# Patient Record
Sex: FEMALE | Race: WHITE | NOT HISPANIC OR LATINO | Employment: UNEMPLOYED | URBAN - METROPOLITAN AREA
[De-identification: names, ages, dates, MRNs, and addresses within clinical notes are randomized per-mention and may not be internally consistent; named-entity substitution may affect disease eponyms.]

---

## 2017-03-07 ENCOUNTER — APPOINTMENT (OUTPATIENT)
Dept: ADMISSIONS | Facility: MEDICAL CENTER | Age: 42
End: 2017-03-07
Attending: SURGERY
Payer: COMMERCIAL

## 2017-03-07 RX ORDER — HYDROCODONE BITARTRATE AND ACETAMINOPHEN 5; 325 MG/1; MG/1
1-2 TABLET ORAL PRN
Status: ON HOLD | COMMUNITY
End: 2017-03-12

## 2017-03-10 ENCOUNTER — HOSPITAL ENCOUNTER (OUTPATIENT)
Facility: MEDICAL CENTER | Age: 42
End: 2017-03-12
Attending: SURGERY | Admitting: SURGERY
Payer: COMMERCIAL

## 2017-03-10 PROBLEM — K80.20 BILIARY CALCULUS: Status: ACTIVE | Noted: 2017-03-10

## 2017-03-10 PROBLEM — K81.0 SUPPURATIVE CHOLECYSTITIS: Status: ACTIVE | Noted: 2017-03-10

## 2017-03-10 LAB
ANION GAP SERPL CALC-SCNC: 11 MMOL/L (ref 0–11.9)
BASOPHILS # BLD AUTO: 0.8 % (ref 0–1.8)
BASOPHILS # BLD: 0.08 K/UL (ref 0–0.12)
BUN SERPL-MCNC: 9 MG/DL (ref 8–22)
CALCIUM SERPL-MCNC: 9.5 MG/DL (ref 8.5–10.5)
CHLORIDE SERPL-SCNC: 105 MMOL/L (ref 96–112)
CO2 SERPL-SCNC: 24 MMOL/L (ref 20–33)
CREAT SERPL-MCNC: 0.91 MG/DL (ref 0.5–1.4)
EOSINOPHIL # BLD AUTO: 0.16 K/UL (ref 0–0.51)
EOSINOPHIL NFR BLD: 1.7 % (ref 0–6.9)
ERYTHROCYTE [DISTWIDTH] IN BLOOD BY AUTOMATED COUNT: 41.3 FL (ref 35.9–50)
GFR SERPL CREATININE-BSD FRML MDRD: >60 ML/MIN/1.73 M 2
GLUCOSE SERPL-MCNC: 95 MG/DL (ref 65–99)
HCG SERPL QL: NEGATIVE
HCT VFR BLD AUTO: 44.9 % (ref 37–47)
HGB BLD-MCNC: 14.6 G/DL (ref 12–16)
IMM GRANULOCYTES # BLD AUTO: 0.07 K/UL (ref 0–0.11)
IMM GRANULOCYTES NFR BLD AUTO: 0.7 % (ref 0–0.9)
LYMPHOCYTES # BLD AUTO: 2.23 K/UL (ref 1–4.8)
LYMPHOCYTES NFR BLD: 23.2 % (ref 22–41)
MCH RBC QN AUTO: 27.2 PG (ref 27–33)
MCHC RBC AUTO-ENTMCNC: 32.5 G/DL (ref 33.6–35)
MCV RBC AUTO: 83.8 FL (ref 81.4–97.8)
MONOCYTES # BLD AUTO: 0.49 K/UL (ref 0–0.85)
MONOCYTES NFR BLD AUTO: 5.1 % (ref 0–13.4)
NEUTROPHILS # BLD AUTO: 6.57 K/UL (ref 2–7.15)
NEUTROPHILS NFR BLD: 68.5 % (ref 44–72)
NRBC # BLD AUTO: 0 K/UL
NRBC BLD AUTO-RTO: 0 /100 WBC
PLATELET # BLD AUTO: 411 K/UL (ref 164–446)
PMV BLD AUTO: 9.2 FL (ref 9–12.9)
POTASSIUM SERPL-SCNC: 4.3 MMOL/L (ref 3.6–5.5)
RBC # BLD AUTO: 5.36 M/UL (ref 4.2–5.4)
SODIUM SERPL-SCNC: 140 MMOL/L (ref 135–145)
WBC # BLD AUTO: 9.6 K/UL (ref 4.8–10.8)

## 2017-03-10 PROCEDURE — 501577 HCHG TROCAR, STEP 11MM: Performed by: SURGERY

## 2017-03-10 PROCEDURE — A4606 OXYGEN PROBE USED W OXIMETER: HCPCS | Performed by: SURGERY

## 2017-03-10 PROCEDURE — 501574 HCHG TROCAR, SMTH CAN&SEAL 5: Performed by: SURGERY

## 2017-03-10 PROCEDURE — A9270 NON-COVERED ITEM OR SERVICE: HCPCS | Performed by: SURGERY

## 2017-03-10 PROCEDURE — 160036 HCHG PACU - EA ADDL 30 MINS PHASE I: Performed by: SURGERY

## 2017-03-10 PROCEDURE — 85025 COMPLETE CBC W/AUTO DIFF WBC: CPT

## 2017-03-10 PROCEDURE — G0378 HOSPITAL OBSERVATION PER HR: HCPCS

## 2017-03-10 PROCEDURE — 700111 HCHG RX REV CODE 636 W/ 250 OVERRIDE (IP): Performed by: SURGERY

## 2017-03-10 PROCEDURE — 110371 HCHG SHELL REV 272: Performed by: SURGERY

## 2017-03-10 PROCEDURE — 96365 THER/PROPH/DIAG IV INF INIT: CPT

## 2017-03-10 PROCEDURE — 700101 HCHG RX REV CODE 250: Mod: JW

## 2017-03-10 PROCEDURE — 502240 HCHG MISC OR SUPPLY RC 0272: Performed by: SURGERY

## 2017-03-10 PROCEDURE — 80048 BASIC METABOLIC PNL TOTAL CA: CPT

## 2017-03-10 PROCEDURE — 110382 HCHG SHELL REV 271: Performed by: SURGERY

## 2017-03-10 PROCEDURE — 700111 HCHG RX REV CODE 636 W/ 250 OVERRIDE (IP)

## 2017-03-10 PROCEDURE — 500445 HCHG HEMOSTAT, SURGICEL 4X8: Performed by: SURGERY

## 2017-03-10 PROCEDURE — 502571 HCHG PACK, LAP CHOLE: Performed by: SURGERY

## 2017-03-10 PROCEDURE — 500697 HCHG HEMOCLIP, LARGE (ORANGE): Performed by: SURGERY

## 2017-03-10 PROCEDURE — 160039 HCHG SURGERY MINUTES - EA ADDL 1 MIN LEVEL 3: Performed by: SURGERY

## 2017-03-10 PROCEDURE — 88304 TISSUE EXAM BY PATHOLOGIST: CPT

## 2017-03-10 PROCEDURE — 700105 HCHG RX REV CODE 258: Performed by: SURGERY

## 2017-03-10 PROCEDURE — 700102 HCHG RX REV CODE 250 W/ 637 OVERRIDE(OP)

## 2017-03-10 PROCEDURE — 502648 HCHG APPLICATOR, EVICEL: Performed by: SURGERY

## 2017-03-10 PROCEDURE — 500389 HCHG DRAIN, RESERVOIR SUCT JP 100CC: Performed by: SURGERY

## 2017-03-10 PROCEDURE — A9270 NON-COVERED ITEM OR SERVICE: HCPCS

## 2017-03-10 PROCEDURE — 160009 HCHG ANES TIME/MIN: Performed by: SURGERY

## 2017-03-10 PROCEDURE — 700102 HCHG RX REV CODE 250 W/ 637 OVERRIDE(OP): Performed by: SURGERY

## 2017-03-10 PROCEDURE — 700101 HCHG RX REV CODE 250: Performed by: SURGERY

## 2017-03-10 PROCEDURE — 500002 HCHG ADHESIVE, DERMABOND: Performed by: SURGERY

## 2017-03-10 PROCEDURE — 501399 HCHG SPECIMAN BAG, ENDO CATC: Performed by: SURGERY

## 2017-03-10 PROCEDURE — 160002 HCHG RECOVERY MINUTES (STAT): Performed by: SURGERY

## 2017-03-10 PROCEDURE — 160028 HCHG SURGERY MINUTES - 1ST 30 MINS LEVEL 3: Performed by: SURGERY

## 2017-03-10 PROCEDURE — 501570 HCHG TROCAR, SEPARATOR: Performed by: SURGERY

## 2017-03-10 PROCEDURE — 502626 HCHG SURGIFLO HEMOSTATIC MATRIX 6ML: Performed by: SURGERY

## 2017-03-10 PROCEDURE — 160048 HCHG OR STATISTICAL LEVEL 1-5: Performed by: SURGERY

## 2017-03-10 PROCEDURE — 501838 HCHG SUTURE GENERAL: Performed by: SURGERY

## 2017-03-10 PROCEDURE — 160035 HCHG PACU - 1ST 60 MINS PHASE I: Performed by: SURGERY

## 2017-03-10 PROCEDURE — 500371 HCHG DRAIN, BLAKE 10MM: Performed by: SURGERY

## 2017-03-10 PROCEDURE — 84703 CHORIONIC GONADOTROPIN ASSAY: CPT

## 2017-03-10 RX ORDER — OXYCODONE HYDROCHLORIDE 5 MG/1
5 TABLET ORAL EVERY 6 HOURS PRN
Status: DISCONTINUED | OUTPATIENT
Start: 2017-03-10 | End: 2017-03-12 | Stop reason: HOSPADM

## 2017-03-10 RX ORDER — ONDANSETRON 2 MG/ML
4 INJECTION INTRAMUSCULAR; INTRAVENOUS PRN
Status: DISCONTINUED | OUTPATIENT
Start: 2017-03-10 | End: 2017-03-12 | Stop reason: HOSPADM

## 2017-03-10 RX ORDER — ACETAMINOPHEN 500 MG
1000 TABLET ORAL EVERY 6 HOURS
Status: DISCONTINUED | OUTPATIENT
Start: 2017-03-10 | End: 2017-03-12 | Stop reason: HOSPADM

## 2017-03-10 RX ORDER — OXYCODONE HCL 5 MG/5 ML
SOLUTION, ORAL ORAL
Status: COMPLETED
Start: 2017-03-10 | End: 2017-03-10

## 2017-03-10 RX ORDER — OXYCODONE HYDROCHLORIDE 10 MG/1
10 TABLET ORAL EVERY 6 HOURS PRN
Status: DISCONTINUED | OUTPATIENT
Start: 2017-03-10 | End: 2017-03-12 | Stop reason: HOSPADM

## 2017-03-10 RX ORDER — BUPIVACAINE HYDROCHLORIDE AND EPINEPHRINE 5; 5 MG/ML; UG/ML
INJECTION, SOLUTION EPIDURAL; INTRACAUDAL; PERINEURAL
Status: DISCONTINUED | OUTPATIENT
Start: 2017-03-10 | End: 2017-03-10 | Stop reason: HOSPADM

## 2017-03-10 RX ORDER — CEFTRIAXONE 2 G/1
2 INJECTION, POWDER, FOR SOLUTION INTRAMUSCULAR; INTRAVENOUS DAILY
Status: DISCONTINUED | OUTPATIENT
Start: 2017-03-10 | End: 2017-03-10

## 2017-03-10 RX ORDER — SODIUM CHLORIDE, SODIUM LACTATE, POTASSIUM CHLORIDE, CALCIUM CHLORIDE 600; 310; 30; 20 MG/100ML; MG/100ML; MG/100ML; MG/100ML
1000 INJECTION, SOLUTION INTRAVENOUS
Status: COMPLETED | OUTPATIENT
Start: 2017-03-10 | End: 2017-03-10

## 2017-03-10 RX ORDER — DEXTROSE MONOHYDRATE, SODIUM CHLORIDE, AND POTASSIUM CHLORIDE 50; 1.49; 4.5 G/1000ML; G/1000ML; G/1000ML
INJECTION, SOLUTION INTRAVENOUS CONTINUOUS
Status: DISCONTINUED | OUTPATIENT
Start: 2017-03-10 | End: 2017-03-11

## 2017-03-10 RX ADMIN — FENTANYL CITRATE 25 MCG: 50 INJECTION, SOLUTION INTRAMUSCULAR; INTRAVENOUS at 13:50

## 2017-03-10 RX ADMIN — POTASSIUM CHLORIDE, DEXTROSE MONOHYDRATE AND SODIUM CHLORIDE: 150; 5; 450 INJECTION, SOLUTION INTRAVENOUS at 18:49

## 2017-03-10 RX ADMIN — ACETAMINOPHEN 1000 MG: 500 TABLET, FILM COATED ORAL at 23:47

## 2017-03-10 RX ADMIN — OXYCODONE HYDROCHLORIDE 5 MG: 5 TABLET ORAL at 18:49

## 2017-03-10 RX ADMIN — ACETAMINOPHEN 1000 MG: 500 TABLET, FILM COATED ORAL at 18:49

## 2017-03-10 RX ADMIN — CEFTRIAXONE 2 G: 2 INJECTION, POWDER, FOR SOLUTION INTRAMUSCULAR; INTRAVENOUS at 18:50

## 2017-03-10 RX ADMIN — SODIUM CHLORIDE, SODIUM LACTATE, POTASSIUM CHLORIDE, CALCIUM CHLORIDE 1000 ML: 600; 310; 30; 20 INJECTION, SOLUTION INTRAVENOUS at 10:00

## 2017-03-10 RX ADMIN — OXYCODONE HYDROCHLORIDE 10 MG: 5 SOLUTION ORAL at 14:00

## 2017-03-10 RX ADMIN — FENTANYL CITRATE 25 MCG: 50 INJECTION, SOLUTION INTRAMUSCULAR; INTRAVENOUS at 14:00

## 2017-03-10 ASSESSMENT — LIFESTYLE VARIABLES
DO YOU DRINK ALCOHOL: NO
EVER_SMOKED: NEVER

## 2017-03-10 ASSESSMENT — PAIN SCALES - GENERAL
PAINLEVEL_OUTOF10: 4
PAINLEVEL_OUTOF10: 4
PAINLEVEL_OUTOF10: 1
PAINLEVEL_OUTOF10: 4
PAINLEVEL_OUTOF10: 1
PAINLEVEL_OUTOF10: 0
PAINLEVEL_OUTOF10: 5
PAINLEVEL_OUTOF10: 1

## 2017-03-10 NOTE — IP AVS SNAPSHOT
" Home Care Instructions                                                                                                                  Name:Katharine Arvizu  Medical Record Number:[id number]  CSN: [id number]    YOB: 1975   Age: 41 y.o.  Sex: female  HT:1.578 m (5' 2.13\") WT: 98.8 kg (217 lb 13 oz)          Admit Date: 3/10/2017     Discharge Date:   Today's Date: 3/12/2017  Attending Doctor:  Uli Cotto M.D.                  Allergies:  Review of patient's allergies indicates no known allergies.            Discharge Instructions       Laparoscopic Cholecystectomy Discharge instructions:    1. DIET: Upon discharge from the hospital you may resume your normal preoperative diet. Depending on how you are feeling and whether you have nausea or not, you may wish to stay with a bland diet for the first few days. However, you can advance this as quickly as you feel ready.    2. ACTIVITIES: After discharge from the hospital, you may resume full routine activities. However, there should be no heavy lifting (greater than 15 pounds) and no strenuous activities until after your follow-up visit. Otherwise, routine activities of daily living are acceptable.    3. DRIVING: You may drive whenever you are off pain medications and are able to perform the activities needed to drive, i.e. turning, bending, twisting, etc.    4. BATHING: You may get the wound wet at any time after leaving the hospital. You may shower, but do not submerge in a bath for at least a week. Dressings may come off after 48 hours.    5. BOWEL FUNCTION: A few patients, after this operation, will develop either frequent or loose stools after meals. This usually corrects itself after a few days, to a few weeks. If this occurs, do not worry; it is not unusual and will resolve. Much more common than loose stools, is constipation. The combination of pain medication and decreased activity level can cause constipation in otherwise normal patients. If " you feel this is occurring, take a laxative (Milk of Magnesia, Ex-Lax, Senokot, etc.) until the problem has resolved.    6. PAIN MEDICATION: You will be given a prescription for pain medication at discharge. Please take these as directed. It is important to remember not to take medications on an empty stomach as this may cause nausea.    7.CALL IF YOU HAVE: (1) Fevers to more than 1010 F, (2) Unusual chest or leg pain, (3) Drainage or fluid from incision that may be foul smelling, increased tenderness or soreness at the wound or the wound edges are no longer together, redness or swelling at the incision site. Please do not hesitate to call with any other questions.      8. APPOINTMENT: Contact our office at [phone] for a follow-up appointment in 1 to 2 weeks following your procedure.    9. DRAIN: Measure and record drain output color and amount daily. Bring record with you to your appointment.     If you have any additional questions, please do not hesitate to call the office and speak to either myself or the physician on call.    Office address:  [address] [place] [id number]    Uli Cotto M.D.  [facility]  [phone]      Discharge Instructions    Discharged to home by car with relative. Discharged via wheelchair, hospital escort: Yes.  Special equipment needed: Not Applicable    Be sure to schedule a follow-up appointment with your primary care doctor or any specialists as instructed.     Discharge Plan:   Influenza Vaccine Indication: Patient Refuses    I understand that a diet low in cholesterol, fat, and sodium is recommended for good health. Unless I have been given specific instructions below for another diet, I accept this instruction as my diet prescription.   Other diet: see above    Special Instructions: None    · Is patient discharged on Warfarin / Coumadin?   No     · Is patient Post Blood Transfusion?  No    Depression / Suicide Risk    As you are discharged from this  [facility] facility, it is important to learn how to keep safe from harming yourself.    Recognize the warning signs:  · Abrupt changes in personality, positive or negative- including increase in energy   · Giving away possessions  · Change in eating patterns- significant weight changes-  positive or negative  · Change in sleeping patterns- unable to sleep or sleeping all the time   · Unwillingness or inability to communicate  · Depression  · Unusual sadness, discouragement and loneliness  · Talk of wanting to die  · Neglect of personal appearance   · Rebelliousness- reckless behavior  · Withdrawal from people/activities they love  · Confusion- inability to concentrate     If you or a loved one observes any of these behaviors or has concerns about self-harm, here's what you can do:  · Talk about it- your feelings and reasons for harming yourself  · Remove any means that you might use to hurt yourself (examples: pills, rope, extension cords, firearm)  · Get professional help from the community (Mental Health, Substance Abuse, psychological counseling)  · Do not be alone:Call your Safe Contact- someone whom you trust who will be there for you.  · Call your local CRISIS HOTLINE 118-5354 or 986-645-7653  · Call your local Children's Mobile Crisis Response Team [place] (217) 480-8984 or www.Vizury  · Call the toll free National Suicide Prevention Hotlines   · National Suicide Prevention Lifeline 928-297-QVDA (9388)  · National Hope Line Network 800-SUICIDE (159-9492)             Discharge Medication Instructions:    Below are the medications your physician expects you to take upon discharge:    Review all your home medications and newly ordered medications with your doctor and/or pharmacist. Follow medication instructions as directed by your doctor and/or pharmacist.    Please keep your medication list with you and share with your physician.               Medication List      START taking these  medications        Instructions    amoxicillin-clavulanate 875-125 MG Tabs   Commonly known as:  AUGMENTIN    Take 1 Tab by mouth 2 times a day for 5 days.   Dose:  1 Tab         CHANGE how you take these medications        Instructions    hydrocodone-acetaminophen 5-325 MG Tabs per tablet   What changed:  when to take this   Commonly known as:  NORCO    Take 1-2 Tabs by mouth every 6 hours as needed.   Dose:  1-2 Tab               Instructions           Diet / Nutrition:    Follow any diet instructions given to you by your doctor or the dietician, including how much salt (sodium) you are allowed each day.    If you are overweight, talk to your doctor about a weight reduction plan.    Activity:    Remain physically active following your doctor's instructions about exercise and activity.    Rest often.     Any time you become even a little tired or short of breath, SIT DOWN and rest.    Worsening Symptoms:    Report any of the following signs and symptoms to the doctor's office immediately:    *Pain of jaw, arm, or neck  *Chest pain not relieved by medication                               *Dizziness or loss of consciousness  *Difficulty breathing even when at rest   *More tired than usual                                       *Bleeding drainage or swelling of surgical site  *Swelling of feet, ankles, legs or stomach                 *Fever (>100ºF)  *Pink or blood tinged sputum  *Weight gain (3lbs/day or 5lbs /week)           *Shock from internal defibrillator (if applicable)  *Palpitations or irregular heartbeats                *Cool and/or numb extremities    Stroke Awareness    Common Risk Factors for Stroke include:    Age  Atrial Fibrillation  Carotid Artery Stenosis  Diabetes Mellitus  Excessive alcohol consumption  High blood pressure  Overweight   Physical inactivity  Smoking    Warning signs and symptoms of a stroke include:    *Sudden numbness or weakness of the face, arm or leg (especially on one side of  the body).  *Sudden confusion, trouble speaking or understanding.  *Sudden trouble seeing in one or both eyes.  *Sudden trouble walking, dizziness, loss of balance or coordination.Sudden severe headache with no known cause.    It is very important to get treatment quickly when a stroke occurs. If you experience any of the above warning signs, call 911 immediately.                   Disclaimer         Quit Smoking / Tobacco Use:    I understand the use of any tobacco products increases my chance of suffering from future heart disease or stroke and could cause other illnesses which may shorten my life. Quitting the use of tobacco products is the single most important thing I can do to improve my health. For further information on smoking / tobacco cessation call a Toll Free Quit Line at 1-295.879.5055 (*National [employer]) or 1-169.764.4103 (American Lung Association) or you can access the web based program at www.lungSpotsetter.org.    [place] Tobacco Users Help Line:  (253) 956-1841       Toll Free: 1-320.235.7991  Quit Tobacco Program [facility] (890)660-2877    Crisis Hotline:    [place] Crisis Hotline:  6-655-QAPMTEV or 1-580.217.1568    [place] Crisis Hotline:    1-155.404.2673 or 709-626-6758    Discharge Survey:   Thank you for choosing [facility]. We hope we did everything we could to make your hospital stay a pleasant one. You may be receiving a phone survey and we would appreciate your time and participation in answering the questions. Your input is very valuable to us in our efforts to improve our service to our patients and their families.        My signature on this form indicates that:    1. I have reviewed and understand the above information.  2. My questions regarding this information have been answered to my satisfaction.  3. I have formulated a plan with my discharge nurse to obtain my prescribed medications for home.                  Disclaimer              __________________________________                     __________       ________                       Patient Signature                                                 Date                    Time

## 2017-03-10 NOTE — IP AVS SNAPSHOT
SenionLab Access Code: [email or web address]  Expires: 4/6/2017 10:06 AM    SenionLab  A secure, online tool to manage your health information     [employer]’s SenionLab® is a secure, online tool that connects you to your personalized health information from the privacy of your home -- day or night - making it very easy for you to manage your healthcare. Once the activation process is completed, you can even access your medical information using the SenionLab zeferino, which is available for free in the Apple Zeferino store or Google Play store.     SenionLab provides the following levels of access (as shown below):   My Chart Features   [facility] Primary Care Doctor [facility]  Specialists [facility]  Urgent  Care Non-[facility]  Primary Care  Doctor   Email your healthcare team securely and privately 24/7 X X X X   Manage appointments: schedule your next appointment; view details of past/upcoming appointments X      Request prescription refills. X      View recent personal medical records, including lab and immunizations X X X X   View health record, including health history, allergies, medications X X X X   Read reports about your outpatient visits, procedures, consult and ER notes X X X X   See your discharge summary, which is a recap of your hospital and/or ER visit that includes your diagnosis, lab results, and care plan. X X       How to register for SenionLab:  1. Go to  [email or web address].  2. Click on the Sign Up Now box, which takes you to the New Member Sign Up page. You will need to provide the following information:  a. Enter your SenionLab Access Code exactly as it appears at the top of this page. (You will not need to use this code after you’ve completed the sign-up process. If you do not sign up before the expiration date, you must request a new code.)   b. Enter your date of birth.   c. Enter your home email address.   d. Click Submit, and follow the next screen’s instructions.  3. Create a SenionLab ID. This will be your SenionLab  login ID and cannot be changed, so think of one that is secure and easy to remember.  4. Create a GridX password. You can change your password at any time.  5. Enter your Password Reset Question and Answer. This can be used at a later time if you forget your password.   6. Enter your e-mail address. This allows you to receive e-mail notifications when new information is available in GridX.  7. Click Sign Up. You can now view your health information.    For assistance activating your GridX account, call [phone]

## 2017-03-10 NOTE — IP AVS SNAPSHOT
3/12/2017          Katharine Arvizu  [address]  [place] [address]    Dear Katharine:    [facility] wants to ensure your discharge home is safe and you or your loved ones have had all your questions answered regarding your care after you leave the hospital.    You may receive a telephone call within two days of your discharge.  This call is to make certain you understand your discharge instructions as well as ensure we provided you with the best care possible during your stay with us.     The call will only last approximately 3-5 minutes and will be done by a nurse.    Once again, we want to ensure your discharge home is safe and that you have a clear understanding of any next steps in your care.  If you have any questions or concerns, please do not hesitate to contact us, we are here for you.  Thank you for choosing [facility] for your healthcare needs.    Sincerely,    Simón Hickman    [facility]

## 2017-03-10 NOTE — IP AVS SNAPSHOT
" <p align=\"LEFT\"><IMG SRC=\"//EMRWB/blob$/[email or web address]\" alt=\"Image\" WIDTH=\"50%\" HEIGHT=\"200\" BORDER=\"\"></p>                   Name:Katharine Arvizu  Medical Record Number:[id number]  CSN: [id number]    YOB: 1975   Age: 41 y.o.  Sex: female  HT:1.578 m (5' 2.13\") WT: 98.8 kg (217 lb 13 oz)          Admit Date: 3/10/2017     Discharge Date:   Today's Date: 3/12/2017  Attending Doctor:  Uli Cotto M.D.                  Allergies:  Review of patient's allergies indicates no known allergies.             Medication List      Take these Medications        Instructions    amoxicillin-clavulanate 875-125 MG Tabs   Commonly known as:  AUGMENTIN    Take 1 Tab by mouth 2 times a day for 5 days.   Dose:  1 Tab       hydrocodone-acetaminophen 5-325 MG Tabs per tablet   What changed:  when to take this   Commonly known as:  NORCO    Take 1-2 Tabs by mouth every 6 hours as needed.   Dose:  1-2 Tab         "

## 2017-03-10 NOTE — OR NURSING
1336 received pt from OR with Dr. Florez, VSS breathing even and unlabored. dermabond and dry gauze to abdomen CDI. Abdomen soft. SRUTHI drain in place, orders to leave open for 1 hour then place to self suction.    1350 pt medicated for 5/10 pain- see MAR.    1400 PO pain medication given- see MAR.  at bedside     1440 SRUTHI placed to self suction per MD orders. Pt c/o being hot ,pt changed to cloth gown, cool air applied and cool washcloths to forehead and neck. Pt denies need for pain medication at this time.    1603 report called to Tez DIANA, plan of care discussed. Transport notified, belongings bagged and on gurney.    1620 pt transferred to [facility],  at side.

## 2017-03-10 NOTE — OP REPORT
Operative Report    Date: 3/10/2017    Surgeon: Uli Cotto M.D.    Assistant: Jaymie Russ PA-C    Anesthesiologist: Dr. Florez    Preoperative Diagnosis: chronic cholecystitis    Postoperative Diagnosis: acute pyogenic cholecystitis, K80.13 Calculus of gallbladder with acute and chronic cholecystitis with obstruction    Procedure Performed: Laparoscopic cholecystectomy     Estimated Blood Loss: 100 cc     Specimens: gallbladder for permanent pathology    Complications: none     Drains: 10 F flat SRUTHI     Findings: acutely inflamed gallbladder with puss    Procedure in detail: The patient was brought to the operating room and was placed in the supine position where general endotracheal anesthesia was induced. SCDs were in place and functioning. Preoperative antibiotics of ancef were given before incision time. The patient's abdomen was prepped and draped in the usual sterile fashion.    After infiltration of local anesthetic, a skin incision was made to accommodate a 5 mm port infra-umbilically. We then used the Veress needle to access the peritoneum, and after saline drop test, we insufflated to 15 mmHg. We then placed the 5mm 30 degree camera and inspected the abdomen for evidence of trauma secondary to Veress needle or port placement, and found none.    Utilizing the 30-degree laparoscope with the patient in the reverse Trendelenburg position, and after infiltration of local anesthetic, an additional 11-mm port was inserted into the epigastrium just to the right of the midline. Then two 5-mm ports were inserted in the midclavicular and anterior axillary lines, in the right upper quadrant, all under direct visualization.    The gallbladder was identified, it appeared inflamed with thickened wall. Dense omental adhesions were taken down. The gallbladder was retracted cephalad and caudally using gallbladder graspers. Using the [place], we were able to peel the overlying peritoneum off the cystic duct, and  circumferentially dissect it. We used electrocautery to futher remove the peritoneum off the gallbladder, to allow visualization of the bottom portion of the cystic plate. We then were able to further dissect Calot's triangle until we identified the cystic artery, which was circumferrentially dissected. This allowed visualization of the complete critical view of safety.      We then doubly clipped the cystic duct distally and divided it. We then doubly clipped the cystic artery  and divided it.Then, using electrocautery, we removed the gallbladder from the liver bed. The wall was very thickened, making dissection difficult. During this, the gallbladder was entered and a large amount of purulent drainage was released. After ensuring gallbladder bed hemostasis with cautery, we removed the gallbladder and placed it in an endocatch bag, and removed it from the epigastric port site.    The right upper quadrant was irrigated copiously with normal saline solution. We inspected the cystic duct and artery stumps, and found them to be intact. The liver bed was hemostatic. Surgicel and Surgiflo were placed. A 10 flat drain was introduced and exteriorized through one of the lateral trocar sites.     The trocars were removed under direct visualization.    The fascia on the all port sites >10 mm were closed with Vicryl sutures. The skin of all incisions was closed with running subcuticular suture.    All sponge, needle, and instrument counts were reported as correct at the end of the procedure. The patient tolerated the procedure well and left the operating room for the recovery room in stable and satisfactory condition.      Disposition: stable, extubated, to PACU    Uli Cotto M.D.  [facility]  [phone]

## 2017-03-11 LAB
ALBUMIN SERPL BCP-MCNC: 3.6 G/DL (ref 3.2–4.9)
ALBUMIN/GLOB SERPL: 1.2 G/DL
ALP SERPL-CCNC: 85 U/L (ref 30–99)
ALT SERPL-CCNC: 40 U/L (ref 2–50)
ANION GAP SERPL CALC-SCNC: 8 MMOL/L (ref 0–11.9)
AST SERPL-CCNC: 54 U/L (ref 12–45)
BASOPHILS # BLD AUTO: 0.1 % (ref 0–1.8)
BASOPHILS # BLD: 0.02 K/UL (ref 0–0.12)
BILIRUB SERPL-MCNC: 0.3 MG/DL (ref 0.1–1.5)
BUN SERPL-MCNC: 8 MG/DL (ref 8–22)
CALCIUM SERPL-MCNC: 9.1 MG/DL (ref 8.5–10.5)
CHLORIDE SERPL-SCNC: 107 MMOL/L (ref 96–112)
CO2 SERPL-SCNC: 22 MMOL/L (ref 20–33)
CREAT SERPL-MCNC: 0.71 MG/DL (ref 0.5–1.4)
EOSINOPHIL # BLD AUTO: 0 K/UL (ref 0–0.51)
EOSINOPHIL NFR BLD: 0 % (ref 0–6.9)
ERYTHROCYTE [DISTWIDTH] IN BLOOD BY AUTOMATED COUNT: 41.8 FL (ref 35.9–50)
GFR SERPL CREATININE-BSD FRML MDRD: >60 ML/MIN/1.73 M 2
GLOBULIN SER CALC-MCNC: 3 G/DL (ref 1.9–3.5)
GLUCOSE SERPL-MCNC: 163 MG/DL (ref 65–99)
HCT VFR BLD AUTO: 39.6 % (ref 37–47)
HGB BLD-MCNC: 12.6 G/DL (ref 12–16)
IMM GRANULOCYTES # BLD AUTO: 0.15 K/UL (ref 0–0.11)
IMM GRANULOCYTES NFR BLD AUTO: 0.8 % (ref 0–0.9)
LYMPHOCYTES # BLD AUTO: 1.21 K/UL (ref 1–4.8)
LYMPHOCYTES NFR BLD: 6.4 % (ref 22–41)
MCH RBC QN AUTO: 26.9 PG (ref 27–33)
MCHC RBC AUTO-ENTMCNC: 31.8 G/DL (ref 33.6–35)
MCV RBC AUTO: 84.4 FL (ref 81.4–97.8)
MONOCYTES # BLD AUTO: 0.61 K/UL (ref 0–0.85)
MONOCYTES NFR BLD AUTO: 3.2 % (ref 0–13.4)
NEUTROPHILS # BLD AUTO: 16.88 K/UL (ref 2–7.15)
NEUTROPHILS NFR BLD: 89.5 % (ref 44–72)
NRBC # BLD AUTO: 0 K/UL
NRBC BLD AUTO-RTO: 0 /100 WBC
PLATELET # BLD AUTO: 424 K/UL (ref 164–446)
PMV BLD AUTO: 9.8 FL (ref 9–12.9)
POTASSIUM SERPL-SCNC: 4.6 MMOL/L (ref 3.6–5.5)
PROT SERPL-MCNC: 6.6 G/DL (ref 6–8.2)
RBC # BLD AUTO: 4.69 M/UL (ref 4.2–5.4)
SODIUM SERPL-SCNC: 137 MMOL/L (ref 135–145)
WBC # BLD AUTO: 18.9 K/UL (ref 4.8–10.8)

## 2017-03-11 PROCEDURE — G0378 HOSPITAL OBSERVATION PER HR: HCPCS

## 2017-03-11 PROCEDURE — 700111 HCHG RX REV CODE 636 W/ 250 OVERRIDE (IP): Performed by: SURGERY

## 2017-03-11 PROCEDURE — 96367 TX/PROPH/DG ADDL SEQ IV INF: CPT

## 2017-03-11 PROCEDURE — 700105 HCHG RX REV CODE 258: Performed by: SURGERY

## 2017-03-11 PROCEDURE — 700102 HCHG RX REV CODE 250 W/ 637 OVERRIDE(OP): Performed by: SURGERY

## 2017-03-11 PROCEDURE — 36415 COLL VENOUS BLD VENIPUNCTURE: CPT

## 2017-03-11 PROCEDURE — 80053 COMPREHEN METABOLIC PANEL: CPT

## 2017-03-11 PROCEDURE — 700101 HCHG RX REV CODE 250: Performed by: SURGERY

## 2017-03-11 PROCEDURE — 85025 COMPLETE CBC W/AUTO DIFF WBC: CPT

## 2017-03-11 PROCEDURE — A9270 NON-COVERED ITEM OR SERVICE: HCPCS | Performed by: SURGERY

## 2017-03-11 RX ADMIN — CEFTRIAXONE 2 G: 2 INJECTION, POWDER, FOR SOLUTION INTRAMUSCULAR; INTRAVENOUS at 07:52

## 2017-03-11 RX ADMIN — ACETAMINOPHEN 1000 MG: 500 TABLET, FILM COATED ORAL at 23:04

## 2017-03-11 RX ADMIN — ACETAMINOPHEN 1000 MG: 500 TABLET, FILM COATED ORAL at 04:44

## 2017-03-11 RX ADMIN — POTASSIUM CHLORIDE, DEXTROSE MONOHYDRATE AND SODIUM CHLORIDE: 150; 5; 450 INJECTION, SOLUTION INTRAVENOUS at 04:46

## 2017-03-11 RX ADMIN — ACETAMINOPHEN 1000 MG: 500 TABLET, FILM COATED ORAL at 17:28

## 2017-03-11 RX ADMIN — ACETAMINOPHEN 1000 MG: 500 TABLET, FILM COATED ORAL at 11:29

## 2017-03-11 ASSESSMENT — PAIN SCALES - GENERAL
PAINLEVEL_OUTOF10: 2
PAINLEVEL_OUTOF10: 3
PAINLEVEL_OUTOF10: 1

## 2017-03-11 NOTE — PROGRESS NOTES
Pt A&O x 4.  Reporting 1/10 pain. Pt declines interventions at this time.   Pt up self to restroom, steady gait noted, voiding without difficulty.  Abd lap stabs well approximated ROWENA well approximated with dermabond. RUQ SRUTHI drain with dressing Dressing CDI. Small amount of sanguinous drainage noted.  Hypoactive bowel sounds upon auscultation. -flatus per pt. Encouraging ambulation.   POC discussed with pt. All needs addressed at this time.

## 2017-03-11 NOTE — PROGRESS NOTES
Patient report received from day shift RN, patient resting in bed. Patient AxO x4, VSS, denies need for pain medication at this time. Patient states she has more of a gassy pain, and was encouraged to ambulate. Patient ambulates with SBA to bathroom, tolerates well. Patient eating slowly to avoid nausea, tolerating clears. 4 lap stabs to abdomen, dermabonded. SRUTHI suction to one of the sites, scant SS output. All needs met at this time, patient calls appropriately.

## 2017-03-11 NOTE — PROGRESS NOTES
Received PACU report and assumed care of patient.  Assessment complete; discussed POC with patient.  AO x4, patient calls for assistance.  Patient appears calm and exhibits no signs of distress.  Patient reports pain of 4/10.  Patient tolerating current diet.  BS normoactive x 4, LBM PTA, patient voids ambulating to bathroom.  Patient is 1x assist, gait not yet obeserved.  Call light within reach, bed in lowest position, SCDs in use, bed alarm refused.  Will continue to monitor pt for changes in status and provide care.

## 2017-03-11 NOTE — CARE PLAN
"Problem: Pain Management  Goal: Pain level will decrease to patient’s comfort goal  Outcome: PROGRESSING AS EXPECTED  Pain well controlled with scheduled tylenol per pt. Pt stating, \"I'm just sore.\" Ice pack provided and in use. Oxy 5-10 mg ordered PRN.    Problem: Mobility  Goal: Risk for activity intolerance will decrease  Outcome: PROGRESSING AS EXPECTED  Encouraging ambulation. Pt up ambulating hallway this morning, self. Steady gait noted. Rest periods provided.         "

## 2017-03-12 VITALS
RESPIRATION RATE: 16 BRPM | DIASTOLIC BLOOD PRESSURE: 83 MMHG | SYSTOLIC BLOOD PRESSURE: 135 MMHG | BODY MASS INDEX: 40.08 KG/M2 | HEIGHT: 62 IN | HEART RATE: 65 BPM | WEIGHT: 217.81 LBS | TEMPERATURE: 97.6 F | OXYGEN SATURATION: 95 %

## 2017-03-12 LAB
BASOPHILS # BLD AUTO: 0.3 % (ref 0–1.8)
BASOPHILS # BLD: 0.04 K/UL (ref 0–0.12)
EOSINOPHIL # BLD AUTO: 0.18 K/UL (ref 0–0.51)
EOSINOPHIL NFR BLD: 1.2 % (ref 0–6.9)
ERYTHROCYTE [DISTWIDTH] IN BLOOD BY AUTOMATED COUNT: 43.3 FL (ref 35.9–50)
HCT VFR BLD AUTO: 38.4 % (ref 37–47)
HGB BLD-MCNC: 11.9 G/DL (ref 12–16)
IMM GRANULOCYTES # BLD AUTO: 0.09 K/UL (ref 0–0.11)
IMM GRANULOCYTES NFR BLD AUTO: 0.6 % (ref 0–0.9)
LYMPHOCYTES # BLD AUTO: 3.34 K/UL (ref 1–4.8)
LYMPHOCYTES NFR BLD: 21.9 % (ref 22–41)
MCH RBC QN AUTO: 26.7 PG (ref 27–33)
MCHC RBC AUTO-ENTMCNC: 31 G/DL (ref 33.6–35)
MCV RBC AUTO: 86.1 FL (ref 81.4–97.8)
MONOCYTES # BLD AUTO: 1.02 K/UL (ref 0–0.85)
MONOCYTES NFR BLD AUTO: 6.7 % (ref 0–13.4)
NEUTROPHILS # BLD AUTO: 10.57 K/UL (ref 2–7.15)
NEUTROPHILS NFR BLD: 69.3 % (ref 44–72)
NRBC # BLD AUTO: 0 K/UL
NRBC BLD AUTO-RTO: 0 /100 WBC
PLATELET # BLD AUTO: 371 K/UL (ref 164–446)
PMV BLD AUTO: 9.6 FL (ref 9–12.9)
RBC # BLD AUTO: 4.46 M/UL (ref 4.2–5.4)
WBC # BLD AUTO: 15.2 K/UL (ref 4.8–10.8)

## 2017-03-12 PROCEDURE — 85025 COMPLETE CBC W/AUTO DIFF WBC: CPT

## 2017-03-12 PROCEDURE — 700102 HCHG RX REV CODE 250 W/ 637 OVERRIDE(OP): Performed by: SURGERY

## 2017-03-12 PROCEDURE — 700111 HCHG RX REV CODE 636 W/ 250 OVERRIDE (IP): Performed by: SURGERY

## 2017-03-12 PROCEDURE — 96367 TX/PROPH/DG ADDL SEQ IV INF: CPT

## 2017-03-12 PROCEDURE — G0378 HOSPITAL OBSERVATION PER HR: HCPCS

## 2017-03-12 PROCEDURE — A9270 NON-COVERED ITEM OR SERVICE: HCPCS | Performed by: SURGERY

## 2017-03-12 PROCEDURE — 36415 COLL VENOUS BLD VENIPUNCTURE: CPT

## 2017-03-12 PROCEDURE — 700105 HCHG RX REV CODE 258: Performed by: SURGERY

## 2017-03-12 RX ORDER — AMOXICILLIN AND CLAVULANATE POTASSIUM 875; 125 MG/1; MG/1
1 TABLET, FILM COATED ORAL 2 TIMES DAILY
Qty: 10 TAB | Refills: 0 | Status: SHIPPED | OUTPATIENT
Start: 2017-03-12 | End: 2017-03-17

## 2017-03-12 RX ORDER — HYDROCODONE BITARTRATE AND ACETAMINOPHEN 5; 325 MG/1; MG/1
1-2 TABLET ORAL EVERY 6 HOURS PRN
Qty: 30 TAB | Refills: 0 | Status: SHIPPED | OUTPATIENT
Start: 2017-03-12

## 2017-03-12 RX ADMIN — ACETAMINOPHEN 1000 MG: 500 TABLET, FILM COATED ORAL at 11:55

## 2017-03-12 RX ADMIN — ACETAMINOPHEN 1000 MG: 500 TABLET, FILM COATED ORAL at 05:07

## 2017-03-12 RX ADMIN — CEFTRIAXONE 2 G: 2 INJECTION, POWDER, FOR SOLUTION INTRAMUSCULAR; INTRAVENOUS at 09:25

## 2017-03-12 ASSESSMENT — PAIN SCALES - GENERAL
PAINLEVEL_OUTOF10: 2
PAINLEVEL_OUTOF10: 0

## 2017-03-12 NOTE — PROGRESS NOTES
Discharge instructions, prescriptions and drain education given to pt. Pt verbalized understanding of drain edu and was able to return demonstrate. Pt has been instructed to call MD office in two days with update of drain output. PIV removed, pt tolerated well. Will order discharge wheelchair at this time.

## 2017-03-12 NOTE — DISCHARGE INSTRUCTIONS
Laparoscopic Cholecystectomy Discharge instructions:    1. DIET: Upon discharge from the hospital you may resume your normal preoperative diet. Depending on how you are feeling and whether you have nausea or not, you may wish to stay with a bland diet for the first few days. However, you can advance this as quickly as you feel ready.    2. ACTIVITIES: After discharge from the hospital, you may resume full routine activities. However, there should be no heavy lifting (greater than 15 pounds) and no strenuous activities until after your follow-up visit. Otherwise, routine activities of daily living are acceptable.    3. DRIVING: You may drive whenever you are off pain medications and are able to perform the activities needed to drive, i.e. turning, bending, twisting, etc.    4. BATHING: You may get the wound wet at any time after leaving the hospital. You may shower, but do not submerge in a bath for at least a week. Dressings may come off after 48 hours.    5. BOWEL FUNCTION: A few patients, after this operation, will develop either frequent or loose stools after meals. This usually corrects itself after a few days, to a few weeks. If this occurs, do not worry; it is not unusual and will resolve. Much more common than loose stools, is constipation. The combination of pain medication and decreased activity level can cause constipation in otherwise normal patients. If you feel this is occurring, take a laxative (Milk of Magnesia, Ex-Lax, Senokot, etc.) until the problem has resolved.    6. PAIN MEDICATION: You will be given a prescription for pain medication at discharge. Please take these as directed. It is important to remember not to take medications on an empty stomach as this may cause nausea.    7.CALL IF YOU HAVE: (1) Fevers to more than 1010 F, (2) Unusual chest or leg pain, (3) Drainage or fluid from incision that may be foul smelling, increased tenderness or soreness at the wound or the wound edges are no  longer together, redness or swelling at the incision site. Please do not hesitate to call with any other questions.      8. APPOINTMENT: Contact our office at [phone] for a follow-up appointment in 1 to 2 weeks following your procedure.    9. DRAIN: Measure and record drain output color and amount daily. Bring record with you to your appointment.     If you have any additional questions, please do not hesitate to call the office and speak to either myself or the physician on call.    Office address:  [address] [place], [place] [address]e [facility] [place], [place] [address]    Uli Cotto M.D.  [facility]  [phone]      Discharge Instructions    Discharged to home by car with relative. Discharged via wheelchair, hospital escort: Yes.  Special equipment needed: Not Applicable    Be sure to schedule a follow-up appointment with your primary care doctor or any specialists as instructed.     Discharge Plan:   Influenza Vaccine Indication: Patient Refuses    I understand that a diet low in cholesterol, fat, and sodium is recommended for good health. Unless I have been given specific instructions below for another diet, I accept this instruction as my diet prescription.   Other diet: see above    Special Instructions: None    · Is patient discharged on Warfarin / Coumadin?   No     · Is patient Post Blood Transfusion?  No    Depression / Suicide Risk    As you are discharged from this [facility] facility, it is important to learn how to keep safe from harming yourself.    Recognize the warning signs:  · Abrupt changes in personality, positive or negative- including increase in energy   · Giving away possessions  · Change in eating patterns- significant weight changes-  positive or negative  · Change in sleeping patterns- unable to sleep or sleeping all the time   · Unwillingness or inability to communicate  · Depression  · Unusual sadness, discouragement and loneliness  · Talk of wanting to die  · Neglect of personal  appearance   · Rebelliousness- reckless behavior  · Withdrawal from people/activities they love  · Confusion- inability to concentrate     If you or a loved one observes any of these behaviors or has concerns about self-harm, here's what you can do:  · Talk about it- your feelings and reasons for harming yourself  · Remove any means that you might use to hurt yourself (examples: pills, rope, extension cords, firearm)  · Get professional help from the community (Mental Health, Substance Abuse, psychological counseling)  · Do not be alone:Call your Safe Contact- someone whom you trust who will be there for you.  · Call your local CRISIS HOTLINE 986-0173 or 917-569-5605  · Call your local Children's Mobile Crisis Response Team [place] (486) 810-8022 or www.[employer]  · Call the toll free National Suicide Prevention Hotlines   · National Suicide Prevention Lifeline 073-435-VFAW (6489)  · National Hope Line Network 800-SUICIDE (986-5881)

## 2017-03-12 NOTE — PROGRESS NOTES
Received report from off going RN.  Assumed patient care and introduced self to patient. Patient AOx4. No complaints of pain/discomfort at this time. SRUTHI drain x1 to R abd. Lap incisions x4 to abd with dermabond closure. Bed in lowest position, bed locked, treaded socks in place, call light within reach. Pt ambulates independently with steady gait. Will continue to monitor.

## 2017-03-12 NOTE — DISCHARGE SUMMARY
Discharge Summary      DATE OF ADMISSION: 3/10/2017    DATE OF DISCHARGE: 3/12/17    ADMISSION DIAGNOSIS (ES):  ? Acute suppurative cholecystitis     DISCHARGE DIAGNOSIS (ES):  ? same    DISCHARGE CONDITION:  ? good    CONSULTATIONS:  ? none    PROCEDURES:  ? 3/10/17 laparoscopic cholecystectomy    BRIEF HPI:  ? 40 yo female who presented to clinic with signs and symptoms of chronic cholecystitis. Scheduled for elective outpatient cholecystectomy.    HOSPITAL COURSE:  ? She underwent the above procedure. She was found to have suppurative cholecystitis at time of surgery, and was admitted. A drain was placed, and she was put on IV antibiotics. She had an uneventful hospital course. HEr labs improved, and she was transitioned to oral antibiotics. The drain was left in place.     MEDS:   Current Outpatient Prescriptions   Medication Sig Dispense Refill   • hydrocodone-acetaminophen (NORCO) 5-325 MG Tab per tablet Take 1-2 Tabs by mouth every 6 hours as needed. 30 Tab 0   • amoxicillin-clavulanate (AUGMENTIN) 875-125 MG Tab Take 1 Tab by mouth 2 times a day for 5 days. 10 Tab 0       FOLLOW-UP:  ? Please call my office at [phone] to make an appointment in 1-2 week(s)    DISCHARGE INSTRUCTIONS:  Laparoscopic Cholecystectomy D/C instructions:    1. DIET: Upon discharge from the hospital you may resume your normal preoperative diet. Depending on how you are feeling and whether you have nausea or not, you may wish to stay with a bland diet for the first few days. However, you can advance this as quickly as you feel ready.    2. ACTIVITIES: After discharge from the hospital, you may resume full routine activities. However, there should be no heavy lifting (greater than 15 pounds) and no strenuous activities until after your follow-up visit. Otherwise, routine activities of daily living are acceptable.    3. DRIVING: You may drive whenever you are off pain medications and are able to perform the activities needed to  drive, i.e. turning, bending, twisting, etc.    4. BATHING: You may get the wound wet at any time after leaving the hospital. You may shower, but do not submerge in a bath for at least a week. Dressings may come off after 48 hours.    5. BOWEL FUNCTION: A few patients, after this operation, will develop either frequent or loose stools after meals. This usually corrects itself after a few days, to a few weeks. If this occurs, do not worry; it is not unusual and will resolve. Much more common than loose stools, is constipation. The combination of pain medication and decreased activity level can cause constipation in otherwise normal patients. If you feel this is occurring, take a laxative (Milk of Magnesia, Ex-Lax, Senokot, etc.) until the problem has resolved.    6. PAIN MEDICATION: You will be given a prescription for pain medication at discharge. Please take these as directed. It is important to remember not to take medications on an empty stomach as this may cause nausea.    7.CALL IF YOU HAVE: (1) Fevers to more than 1010 F, (2) Unusual chest or leg pain, (3) Drainage or fluid from incision that may be foul smelling, increased tenderness or soreness at the wound or the wound edges are no longer together, redness or swelling at the incision site. Please do not hesitate to call with any other questions.     8. APPOINTMENT: Contact our office at [phone] for a follow-up appointment in 1 to 2 weeks following your procedure.    9. DRAIN: Measure and record drain output color and amount daily. Bring record with you to your appointment.     If you have any additional questions, please do not hesitate to call the office and speak to either myself or the physician on call.    Office address:  [address] [place] [address], [place] [address]    Uli Cotto M.D.  [facility]  [phone]

## 2017-03-12 NOTE — PROGRESS NOTES
Patient resting in bed with call light in reach, non slip socks, and bed in lowest position.  A&O x 4.  Ambulates self with steady gait.  Tolerating diet.  RUQ Raúl-Diego Drain patent and to self-suction.  Dressing is clean, dry, and intact.    Passing flatus.

## 2020-03-07 ENCOUNTER — HOSPITAL ENCOUNTER (EMERGENCY)
Facility: MEDICAL CENTER | Age: 45
End: 2020-03-07
Attending: EMERGENCY MEDICINE
Payer: COMMERCIAL

## 2020-03-07 ENCOUNTER — APPOINTMENT (OUTPATIENT)
Dept: RADIOLOGY | Facility: MEDICAL CENTER | Age: 45
End: 2020-03-07
Attending: EMERGENCY MEDICINE
Payer: COMMERCIAL

## 2020-03-07 VITALS
DIASTOLIC BLOOD PRESSURE: 73 MMHG | HEART RATE: 85 BPM | TEMPERATURE: 98.7 F | OXYGEN SATURATION: 94 % | SYSTOLIC BLOOD PRESSURE: 139 MMHG | HEIGHT: 62 IN | BODY MASS INDEX: 40.16 KG/M2 | RESPIRATION RATE: 18 BRPM | WEIGHT: 218.26 LBS

## 2020-03-07 DIAGNOSIS — G44.209 TENSION HEADACHE: ICD-10-CM

## 2020-03-07 DIAGNOSIS — J10.1 INFLUENZA A H1N1 INFECTION: ICD-10-CM

## 2020-03-07 LAB
ALBUMIN SERPL BCP-MCNC: 4.4 G/DL (ref 3.2–4.9)
ALBUMIN/GLOB SERPL: 1.2 G/DL
ALP SERPL-CCNC: 97 U/L (ref 30–99)
ALT SERPL-CCNC: 11 U/L (ref 2–50)
ANION GAP SERPL CALC-SCNC: 13 MMOL/L (ref 7–16)
AST SERPL-CCNC: 25 U/L (ref 12–45)
BASOPHILS # BLD AUTO: 0.4 % (ref 0–1.8)
BASOPHILS # BLD: 0.03 K/UL (ref 0–0.12)
BILIRUB SERPL-MCNC: 0.3 MG/DL (ref 0.1–1.5)
BUN SERPL-MCNC: 7 MG/DL (ref 8–22)
CALCIUM SERPL-MCNC: 9.3 MG/DL (ref 8.4–10.2)
CHLORIDE SERPL-SCNC: 100 MMOL/L (ref 96–112)
CO2 SERPL-SCNC: 24 MMOL/L (ref 20–33)
CREAT SERPL-MCNC: 0.81 MG/DL (ref 0.5–1.4)
EKG IMPRESSION: NORMAL
EOSINOPHIL # BLD AUTO: 0 K/UL (ref 0–0.51)
EOSINOPHIL NFR BLD: 0 % (ref 0–6.9)
ERYTHROCYTE [DISTWIDTH] IN BLOOD BY AUTOMATED COUNT: 44.6 FL (ref 35.9–50)
FLUAV RNA SPEC QL NAA+PROBE: POSITIVE
FLUBV RNA SPEC QL NAA+PROBE: NEGATIVE
GLOBULIN SER CALC-MCNC: 3.6 G/DL (ref 1.9–3.5)
GLUCOSE SERPL-MCNC: 111 MG/DL (ref 65–99)
HCT VFR BLD AUTO: 45.2 % (ref 37–47)
HGB BLD-MCNC: 14.6 G/DL (ref 12–16)
IMM GRANULOCYTES # BLD AUTO: 0.04 K/UL (ref 0–0.11)
IMM GRANULOCYTES NFR BLD AUTO: 0.5 % (ref 0–0.9)
LYMPHOCYTES # BLD AUTO: 0.91 K/UL (ref 1–4.8)
LYMPHOCYTES NFR BLD: 12.2 % (ref 22–41)
MCH RBC QN AUTO: 26.6 PG (ref 27–33)
MCHC RBC AUTO-ENTMCNC: 32.3 G/DL (ref 33.6–35)
MCV RBC AUTO: 82.5 FL (ref 81.4–97.8)
MONOCYTES # BLD AUTO: 0.84 K/UL (ref 0–0.85)
MONOCYTES NFR BLD AUTO: 11.3 % (ref 0–13.4)
NEUTROPHILS # BLD AUTO: 5.62 K/UL (ref 2–7.15)
NEUTROPHILS NFR BLD: 75.6 % (ref 44–72)
NRBC # BLD AUTO: 0 K/UL
NRBC BLD-RTO: 0 /100 WBC
PLATELET # BLD AUTO: 269 K/UL (ref 164–446)
PMV BLD AUTO: 9.3 FL (ref 9–12.9)
POTASSIUM SERPL-SCNC: 3.7 MMOL/L (ref 3.6–5.5)
PROT SERPL-MCNC: 8 G/DL (ref 6–8.2)
RBC # BLD AUTO: 5.48 M/UL (ref 4.2–5.4)
S PYO AG THROAT QL: NORMAL
SIGNIFICANT IND 70042: NORMAL
SITE SITE: NORMAL
SODIUM SERPL-SCNC: 137 MMOL/L (ref 135–145)
SOURCE SOURCE: NORMAL
TROPONIN T SERPL-MCNC: <6 NG/L (ref 6–19)
WBC # BLD AUTO: 7.4 K/UL (ref 4.8–10.8)

## 2020-03-07 PROCEDURE — 700105 HCHG RX REV CODE 258: Performed by: EMERGENCY MEDICINE

## 2020-03-07 PROCEDURE — 700111 HCHG RX REV CODE 636 W/ 250 OVERRIDE (IP): Performed by: EMERGENCY MEDICINE

## 2020-03-07 PROCEDURE — 87502 INFLUENZA DNA AMP PROBE: CPT

## 2020-03-07 PROCEDURE — 99284 EMERGENCY DEPT VISIT MOD MDM: CPT

## 2020-03-07 PROCEDURE — 84484 ASSAY OF TROPONIN QUANT: CPT

## 2020-03-07 PROCEDURE — 700111 HCHG RX REV CODE 636 W/ 250 OVERRIDE (IP)

## 2020-03-07 PROCEDURE — 80053 COMPREHEN METABOLIC PANEL: CPT

## 2020-03-07 PROCEDURE — 96375 TX/PRO/DX INJ NEW DRUG ADDON: CPT

## 2020-03-07 PROCEDURE — 71045 X-RAY EXAM CHEST 1 VIEW: CPT

## 2020-03-07 PROCEDURE — 87880 STREP A ASSAY W/OPTIC: CPT

## 2020-03-07 PROCEDURE — 85025 COMPLETE CBC W/AUTO DIFF WBC: CPT

## 2020-03-07 PROCEDURE — 96374 THER/PROPH/DIAG INJ IV PUSH: CPT

## 2020-03-07 PROCEDURE — 93005 ELECTROCARDIOGRAM TRACING: CPT | Performed by: EMERGENCY MEDICINE

## 2020-03-07 RX ORDER — METOCLOPRAMIDE HYDROCHLORIDE 5 MG/ML
10 INJECTION INTRAMUSCULAR; INTRAVENOUS ONCE
Status: COMPLETED | OUTPATIENT
Start: 2020-03-07 | End: 2020-03-07

## 2020-03-07 RX ORDER — KETOROLAC TROMETHAMINE 30 MG/ML
INJECTION, SOLUTION INTRAMUSCULAR; INTRAVENOUS
Status: COMPLETED
Start: 2020-03-07 | End: 2020-03-07

## 2020-03-07 RX ORDER — SODIUM CHLORIDE 9 MG/ML
1000 INJECTION, SOLUTION INTRAVENOUS ONCE
Status: COMPLETED | OUTPATIENT
Start: 2020-03-07 | End: 2020-03-07

## 2020-03-07 RX ORDER — KETOROLAC TROMETHAMINE 30 MG/ML
30 INJECTION, SOLUTION INTRAMUSCULAR; INTRAVENOUS ONCE
Status: COMPLETED | OUTPATIENT
Start: 2020-03-07 | End: 2020-03-07

## 2020-03-07 RX ADMIN — KETOROLAC TROMETHAMINE 30 MG: 30 INJECTION, SOLUTION INTRAMUSCULAR; INTRAVENOUS at 17:51

## 2020-03-07 RX ADMIN — KETOROLAC TROMETHAMINE 30 MG: 30 INJECTION, SOLUTION INTRAMUSCULAR at 17:51

## 2020-03-07 RX ADMIN — SODIUM CHLORIDE 1000 ML: 9 INJECTION, SOLUTION INTRAVENOUS at 17:51

## 2020-03-07 RX ADMIN — METOCLOPRAMIDE 10 MG: 5 INJECTION, SOLUTION INTRAMUSCULAR; INTRAVENOUS at 17:48

## 2020-03-07 NOTE — ED TRIAGE NOTES
"Presents accompanied by family.  Pt has been experiencing flu like symptomatology including cough, fever, and generalized aches persisting since this past Thursday. Pt denies any domestic or international travel within the past 14 days.    Chief Complaint   Patient presents with   • Cough   • Fever   • Headache   • Back Pain     /90   Pulse 88   Temp 37.1 °C (98.7 °F) (Temporal)   Resp 18   Ht 1.575 m (5' 2\")   Wt 99 kg (218 lb 4.1 oz)   LMP 02/29/2020 (Approximate)   SpO2 95%   BMI 39.92 kg/m²     "

## 2020-03-08 NOTE — ED NOTES
Pt states she has had a HA and body aches with nausea. Pt states some light headedness. Pt able to talk in full sentences [id number]

## 2020-03-08 NOTE — ED NOTES
Pt moved to RM 5  Report given to Alfredo DIANA   EKG being done at BS  ED tech to start SL and draw blood   Voided UA rec'ed and sent to lab

## 2020-03-08 NOTE — ED PROVIDER NOTES
ED Provider Note    CHIEF COMPLAINT  Chief Complaint   Patient presents with   • Cough   • Fever   • Headache   • Back Pain       HPI  Katharine Arvizu is a 44 y.o. female who presents with 48 hours of nonproductive cough, subjective fevers, chills, body aches, severe headache.  Bilateral flanks are aching.  She denies dysuria, but she states that her urine feels warm.  Her head is throbbing and is only minimal receptive to Motrin and Tylenol.  She is felt nauseated off and on, but she is had no vomiting or diarrhea.  She did not get a flu shot.  She has had influenza a previously.  She had some palpitations this morning but she denies chest pain.  No shortness of breath.  She has had no known exposures to covid 19, but she did fly back and forth to [place] 1 week ago.    I used full protective care and was in the room less than 3 minutes.    REVIEW OF SYSTEMS  See HPI for further details.  No vomiting, chest pain.  All other systems are negative.    PAST MEDICAL HISTORY  Past Medical History:   Diagnosis Date   • Diabetes (HCC)     gestational only   • Heart burn    • Hypertension     only during pregnancy   • Influenza A    • Sinus infection        FAMILY HISTORY  History reviewed. No pertinent family history.    SOCIAL HISTORY  Social History     Socioeconomic History   • Marital status:      Spouse name: Not on file   • Number of children: Not on file   • Years of education: Not on file   • Highest education level: Not on file   Occupational History   • Not on file   Social Needs   • Financial resource strain: Not on file   • Food insecurity     Worry: Not on file     Inability: Not on file   • Transportation needs     Medical: Not on file     Non-medical: Not on file   Tobacco Use   • Smoking status: Never Smoker   • Smokeless tobacco: Never Used   Substance and Sexual Activity   • Alcohol use: Yes     Comment: 3 x year   • Drug use: No   • Sexual activity: Not on file   Lifestyle   • Physical  "activity     Days per week: Not on file     Minutes per session: Not on file   • Stress: Not on file   Relationships   • Social connections     Talks on phone: Not on file     Gets together: Not on file     Attends Shinto service: Not on file     Active member of club or organization: Not on file     Attends meetings of clubs or organizations: Not on file     Relationship status: Not on file   • Intimate partner violence     Fear of current or ex partner: Not on file     Emotionally abused: Not on file     Physically abused: Not on file     Forced sexual activity: Not on file   Other Topics Concern   • Not on file   Social History Narrative   • Not on file       SURGICAL HISTORY  Past Surgical History:   Procedure Laterality Date   • DEV BY LAPAROSCOPY N/A 3/10/2017    Procedure: DEV BY LAPAROSCOPY;  Surgeon: Uli Cotto M.D.;  Location: SURGERY SAME DAY [facility];  Service:    • PRIMARY C SECTION  3/22/2016    Procedure: PRIMARY C SECTION;  Surgeon: Alma Rosa Stark M.D.;  Location: LABOR AND DELIVERY;  Service:    • SEPTOPLASTY     • SINUSOTOMIES     • TONSILLECTOMY         CURRENT MEDICATIONS    Current Facility-Administered Medications:   •  ketorolac (TORADOL) injection 30 mg, 30 mg, Intravenous, Once, Jocelyn [place] M.D.  •  metoclopramide (REGLAN) injection 10 mg, 10 mg, Intravenous, Once, Jocelyn [place] M.D.  •  NS infusion 1,000 mL, 1,000 mL, Intravenous, Once, Jocelyn [place] M.D.    Current Outpatient Medications:   •  hydrocodone-acetaminophen (NORCO) 5-325 MG Tab per tablet, Take 1-2 Tabs by mouth every 6 hours as needed., Disp: 30 Tab, Rfl: 0      ALLERGIES  No Known Allergies    PHYSICAL EXAM  VITAL SIGNS: /90   Pulse 88   Temp 37.1 °C (98.7 °F) (Temporal)   Resp 18   Ht 1.575 m (5' 2\")   Wt 99 kg (218 lb 4.1 oz)   LMP 02/29/2020 (Approximate)   SpO2 95%   BMI 39.92 kg/m²  @ZAID[[id number]::@   Constitutional: Well developed, obese, No acute respiratory distress, Non-toxic " appearance.   HENT: Normocephalic, Atraumatic, Bilateral external ears normal, Oropharynx clear without erythema or exudates, mucous membranes are moist.  Bilateral TMs are normal.  Eyes: PERRLA 3 mm bilaterally, EOMI, Conjunctiva normal, No discharge. No icterus.  Neck: Normal range of motion. Supple, No stridor.   Lymphatic: No cervical lymphadenopathy noted.   Cardiovascular: Normal heart rate, Normal rhythm, No murmurs, No rubs, No gallops.   Thorax & Lungs: Clear to auscultation bilaterally, No respiratory distress, No wheezing.  No rales or rhonchi.  No active cough at this time  Abdomen: Bowel sounds normal, Soft, No tenderness, No masses, no rebound or guarding   Skin: Warm, Dry, No erythema, No rash.   Extremities: Intact distal pulses, No edema, No tenderness  Musculoskeletal: Good range of motion in all major joints. No tenderness to palpation or major deformities noted. Normal gait.  Neurologic: Alert & oriented x 3, cranial nerve and cerebellar exams grossly normal.  No meningeal signs.  Psychiatric: Affect normal, Judgment normal, Mood normal.     RADIOLOGY/PROCEDURES  DX-CHEST-PORTABLE (1 VIEW)   Final Result         No acute cardiac or pulmonary abnormality is identified.            COURSE & MEDICAL DECISION MAKING  Pertinent Labs & Imaging studies reviewed. (See chart for details)  Differential diagnosis includes viral illness which is likely influenza, pneumonia, bronchitis.  Much less likely total bed except for the patient's travel and obesity.  Patient was given IV fluids, Toradol and Reglan for her headache and likely dehydration.  Results for orders placed or performed during the hospital encounter of 03/07/20   CBC WITH DIFFERENTIAL   Result Value Ref Range    WBC 7.4 4.8 - 10.8 K/uL    RBC 5.48 (H) 4.20 - 5.40 M/uL    Hemoglobin 14.6 12.0 - 16.0 g/dL    Hematocrit 45.2 37.0 - 47.0 %    MCV 82.5 81.4 - 97.8 fL    MCH 26.6 (L) 27.0 - 33.0 pg    MCHC 32.3 (L) 33.6 - 35.0 g/dL    RDW 44.6 35.9 -  50.0 fL    Platelet Count 269 164 - 446 K/uL    MPV 9.3 9.0 - 12.9 fL    Neutrophils-Polys 75.60 (H) 44.00 - 72.00 %    Lymphocytes 12.20 (L) 22.00 - 41.00 %    Monocytes 11.30 0.00 - 13.40 %    Eosinophils 0.00 0.00 - 6.90 %    Basophils 0.40 0.00 - 1.80 %    Immature Granulocytes 0.50 0.00 - 0.90 %    Nucleated RBC 0.00 /100 WBC    Neutrophils (Absolute) 5.62 2.00 - 7.15 K/uL    Lymphs (Absolute) 0.91 (L) 1.00 - 4.80 K/uL    Monos (Absolute) 0.84 0.00 - 0.85 K/uL    Eos (Absolute) 0.00 0.00 - 0.51 K/uL    Baso (Absolute) 0.03 0.00 - 0.12 K/uL    Immature Granulocytes (abs) 0.04 0.00 - 0.11 K/uL    NRBC (Absolute) 0.00 K/uL   COMP METABOLIC PANEL   Result Value Ref Range    Sodium 137 135 - 145 mmol/L    Potassium 3.7 3.6 - 5.5 mmol/L    Chloride 100 96 - 112 mmol/L    Co2 24 20 - 33 mmol/L    Anion Gap 13.0 7.0 - 16.0    Glucose 111 (H) 65 - 99 mg/dL    Bun 7 (L) 8 - 22 mg/dL    Creatinine 0.81 0.50 - 1.40 mg/dL    Calcium 9.3 8.4 - 10.2 mg/dL    AST(SGOT) 25 12 - 45 U/L    ALT(SGPT) 11 2 - 50 U/L    Alkaline Phosphatase 97 30 - 99 U/L    Total Bilirubin 0.3 0.1 - 1.5 mg/dL    Albumin 4.4 3.2 - 4.9 g/dL    Total Protein 8.0 6.0 - 8.2 g/dL    Globulin 3.6 (H) 1.9 - 3.5 g/dL    A-G Ratio 1.2 g/dL   TROPONIN   Result Value Ref Range    Troponin T <6 6 - 19 ng/L   Influenza A/B By PCR (Adult - Flu Only)   Result Value Ref Range    Influenza virus A RNA POSITIVE (A) Negative    Influenza virus B, PCR Negative Negative   ESTIMATED GFR   Result Value Ref Range    GFR If African American >60 >60 mL/min/1.73 m 2    GFR If Non African American >60 >60 mL/min/1.73 m 2   RAPID STREP,CULT IF INDICATED   Result Value Ref Range    Significant Indicator NEG     Source THRT     Site -     Rapid Strep Screen       Negative for Group A streptococcus.  A negative result may be obtained if the specimen is  inadequate or antigen concentration is below the  sensitivity of the test. This negative test will be followed  up with a culture  as requested.     EKG (NOW)   Result Value Ref Range    Report       [facility]    Test Date:  2020  Pt Name:    DOMINIQUE HU                Department: [facility]  MRN:        [id number]                      Room:       -ROOM 5  Gender:     Female                       Technician: MATTHEW  :        1975                   Requested By:ISABELLA RUBIO  Order #:    [id number]                    Reading MD: ISABELLA RUBIO MD    Measurements  Intervals                                Axis  Rate:       90                           P:          26  WV:         144                          QRS:        37  QRSD:       90                           T:          21  QT:         348  QTc:        426    Interpretive Statements  SINUS RHYTHM  CONSIDER LEFT ATRIAL ABNORMALITY  No previous ECG available for comparison  Electronically Signed On 3-7-2020 19:16:30 PST by ISABELLA RUBIO MD        EKG  Twelve-lead EKG by my interpretation is as above.  No ST or T wave changes to indicate ischemia or infarct    After IV fluids and medications, patient felt significantly better.  She is young, healthy, has a normal pulse ox and will likely tolerate the influenza fine at home.  She is comfortable with this plan.     The patient will return for new or worsening symptoms and is stable at the time of discharge.    The patient is referred to a primary physician for blood pressure management, diabetic screening, and for all other preventative health concerns.      DISPOSITION:  Patient will be discharged home in stable condition.    FOLLOW UP:  Darryn Moctezuma M.D.  [address] [place] [address] [phone]  [address]  [place] [address]  [phone]    Schedule an appointment as soon as possible for a visit           FINAL IMPRESSION  1. Influenza A H1N1 infection    2. Tension headache               Electronically signed by: Isabella Rubio M.D., 3/7/2020 5:11 PM

## 2020-03-11 LAB
S PYO SPEC QL CULT: NORMAL
SIGNIFICANT IND 70042: NORMAL
SITE SITE: NORMAL
SOURCE SOURCE: NORMAL

## 2021-10-04 ENCOUNTER — OFFICE VISIT (OUTPATIENT)
Dept: URGENT CARE | Facility: CLINIC | Age: 46
End: 2021-10-04
Payer: COMMERCIAL

## 2021-10-04 VITALS
SYSTOLIC BLOOD PRESSURE: 138 MMHG | WEIGHT: 217 LBS | OXYGEN SATURATION: 96 % | HEART RATE: 118 BPM | HEIGHT: 62 IN | DIASTOLIC BLOOD PRESSURE: 80 MMHG | RESPIRATION RATE: 15 BRPM | TEMPERATURE: 98.1 F | BODY MASS INDEX: 39.93 KG/M2

## 2021-10-04 DIAGNOSIS — R68.89 FLU-LIKE SYMPTOMS: ICD-10-CM

## 2021-10-04 LAB
FLUAV+FLUBV AG SPEC QL IA: NEGATIVE
INT CON NEG: NEGATIVE
INT CON NEG: NEGATIVE
INT CON POS: POSITIVE
INT CON POS: POSITIVE
S PYO AG THROAT QL: NEGATIVE

## 2021-10-04 PROCEDURE — 87804 INFLUENZA ASSAY W/OPTIC: CPT | Performed by: NURSE PRACTITIONER

## 2021-10-04 PROCEDURE — 87880 STREP A ASSAY W/OPTIC: CPT | Performed by: NURSE PRACTITIONER

## 2021-10-04 PROCEDURE — 99203 OFFICE O/P NEW LOW 30 MIN: CPT | Performed by: NURSE PRACTITIONER

## 2021-10-04 RX ORDER — IBUPROFEN 800 MG/1
TABLET ORAL
COMMUNITY
Start: 2021-07-28 | End: 2023-09-01

## 2021-10-04 ASSESSMENT — ENCOUNTER SYMPTOMS
ORTHOPNEA: 0
MYALGIAS: 1
HEADACHES: 1
COUGH: 1
WHEEZING: 0
SORE THROAT: 1
NAUSEA: 1
CHILLS: 1
DIARRHEA: 1
SPUTUM PRODUCTION: 0
FEVER: 1
EYE DISCHARGE: 0
SHORTNESS OF BREATH: 0

## 2021-10-04 ASSESSMENT — FIBROSIS 4 INDEX: FIB4 SCORE: 1.32

## 2021-10-04 NOTE — PROGRESS NOTES
Subjective     Katharine Arvizu is a 46 y.o. female who presents with Influenza (Headache, fever, body aches, sore throat pain and ear pain. Nausea and Diarrhea)            HPI   New problem.  Patient is a 46-year-old female who presents with flulike symptoms of headache, fever, body aches, sore throat, and ear pain as well as mild nausea and diarrhea.  She also has mild nasal congestion and cough.  She denies shortness of breath, wheezing, loss of taste or smell.  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  She been taking over-the-counter 800 mg ibuprofen for her symptoms.    Patient has no known allergies.  Current Outpatient Medications on File Prior to Visit   Medication Sig Dispense Refill   • ibuprofen (MOTRIN) 800 MG Tab      • hydrocodone-acetaminophen (NORCO) 5-325 MG Tab per tablet Take 1-2 Tabs by mouth every 6 hours as needed. (Patient not taking: Reported on 10/4/2021) 30 Tab 0     No current facility-administered medications on file prior to visit.     Social History     Socioeconomic History   • Marital status:      Spouse name: Not on file   • Number of children: Not on file   • Years of education: Not on file   • Highest education level: Not on file   Occupational History   • Not on file   Tobacco Use   • Smoking status: Never Smoker   • Smokeless tobacco: Never Used   Substance and Sexual Activity   • Alcohol use: Yes     Comment: 3 x year   • Drug use: No   • Sexual activity: Not on file   Other Topics Concern   • Not on file   Social History Narrative   • Not on file     Social Determinants of Health     Financial Resource Strain:    • Difficulty of Paying Living Expenses:    Food Insecurity:    • Worried About Running Out of Food in the Last Year:    • Ran Out of Food in the Last Year:    Transportation Needs:    • Lack of Transportation (Medical):    • Lack of Transportation (Non-Medical):    Physical Activity:    • Days of Exercise per Week:   "  • Minutes of Exercise per Session:    Stress:    • Feeling of Stress :    Social Connections:    • Frequency of Communication with Friends and Family:    • Frequency of Social Gatherings with Friends and Family:    • Attends Taoism Services:    • Active Member of Clubs or Organizations:    • Attends Club or Organization Meetings:    • Marital Status:    Intimate Partner Violence:    • Fear of Current or Ex-Partner:    • Emotionally Abused:    • Physically Abused:    • Sexually Abused:      Breast Cancer-related family history is not on file.      Review of Systems   Constitutional: Positive for chills, fever and malaise/fatigue.   HENT: Positive for congestion and sore throat.    Eyes: Negative for discharge.   Respiratory: Positive for cough. Negative for sputum production, shortness of breath and wheezing.    Cardiovascular: Negative for chest pain and orthopnea.   Gastrointestinal: Positive for diarrhea and nausea.   Musculoskeletal: Positive for myalgias.   Neurological: Positive for headaches.   Endo/Heme/Allergies: Negative for environmental allergies.              Objective     /80 (BP Location: Left arm, Patient Position: Sitting, BP Cuff Size: Adult)   Pulse (!) 118   Temp 36.7 °C (98.1 °F) (Temporal)   Resp 15   Ht 1.575 m (5' 2\")   Wt 98.4 kg (217 lb)   SpO2 96%   BMI 39.69 kg/m²      Physical Exam  Vitals and nursing note reviewed.   Constitutional:       General: She is not in acute distress.     Appearance: She is well-developed.   HENT:      Head: Normocephalic.      Right Ear: Tympanic membrane and external ear normal.      Left Ear: Tympanic membrane and external ear normal.      Nose: Mucosal edema present.      Mouth/Throat:      Pharynx: Posterior oropharyngeal erythema present.   Eyes:      General:         Right eye: No discharge.         Left eye: No discharge.      Conjunctiva/sclera: Conjunctivae normal.   Cardiovascular:      Rate and Rhythm: Normal rate and regular " rhythm.      Heart sounds: Normal heart sounds.   Pulmonary:      Effort: Pulmonary effort is normal.      Breath sounds: Normal breath sounds. No wheezing or rales.   Musculoskeletal:         General: Normal range of motion.      Cervical back: Normal range of motion and neck supple.   Lymphadenopathy:      Cervical: No cervical adenopathy.   Skin:     General: Skin is warm and dry.   Neurological:      Mental Status: She is alert and oriented to person, place, and time.   Psychiatric:         Behavior: Behavior normal.         Thought Content: Thought content normal.                             Assessment & Plan           1. Flu-like symptoms  POCT Rapid Strep A    POCT Influenza A/B    CANCELED: COVID/SARS CoV-2 PCR     Strep and influenza negative.  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  Reviewed symptomatic care options.  Differential diagnosis, natural history, supportive care, and indications for immediate follow-up discussed at length.

## 2022-02-04 ENCOUNTER — APPOINTMENT (OUTPATIENT)
Dept: RADIOLOGY | Facility: MEDICAL CENTER | Age: 47
End: 2022-02-04
Attending: EMERGENCY MEDICINE
Payer: COMMERCIAL

## 2022-02-04 ENCOUNTER — HOSPITAL ENCOUNTER (EMERGENCY)
Facility: MEDICAL CENTER | Age: 47
End: 2022-02-04
Attending: EMERGENCY MEDICINE
Payer: COMMERCIAL

## 2022-02-04 VITALS
BODY MASS INDEX: 38.54 KG/M2 | HEART RATE: 66 BPM | OXYGEN SATURATION: 97 % | RESPIRATION RATE: 16 BRPM | SYSTOLIC BLOOD PRESSURE: 136 MMHG | DIASTOLIC BLOOD PRESSURE: 71 MMHG | WEIGHT: 209.44 LBS | HEIGHT: 62 IN | TEMPERATURE: 97 F

## 2022-02-04 DIAGNOSIS — R07.2 PRECORDIAL CHEST PAIN: ICD-10-CM

## 2022-02-04 LAB
ALBUMIN SERPL BCP-MCNC: 4.7 G/DL (ref 3.2–4.9)
ALBUMIN/GLOB SERPL: 1.5 G/DL
ALP SERPL-CCNC: 114 U/L (ref 30–99)
ALT SERPL-CCNC: 9 U/L (ref 2–50)
ANION GAP SERPL CALC-SCNC: 13 MMOL/L (ref 7–16)
AST SERPL-CCNC: 15 U/L (ref 12–45)
BASOPHILS # BLD AUTO: 0.4 % (ref 0–1.8)
BASOPHILS # BLD: 0.06 K/UL (ref 0–0.12)
BILIRUB SERPL-MCNC: 0.4 MG/DL (ref 0.1–1.5)
BUN SERPL-MCNC: 9 MG/DL (ref 8–22)
CALCIUM SERPL-MCNC: 9.3 MG/DL (ref 8.5–10.5)
CHLORIDE SERPL-SCNC: 103 MMOL/L (ref 96–112)
CO2 SERPL-SCNC: 24 MMOL/L (ref 20–33)
CREAT SERPL-MCNC: 0.71 MG/DL (ref 0.5–1.4)
EKG IMPRESSION: NORMAL
EOSINOPHIL # BLD AUTO: 0.08 K/UL (ref 0–0.51)
EOSINOPHIL NFR BLD: 0.6 % (ref 0–6.9)
ERYTHROCYTE [DISTWIDTH] IN BLOOD BY AUTOMATED COUNT: 45.3 FL (ref 35.9–50)
GLOBULIN SER CALC-MCNC: 3.2 G/DL (ref 1.9–3.5)
GLUCOSE SERPL-MCNC: 101 MG/DL (ref 65–99)
HCT VFR BLD AUTO: 48 % (ref 37–47)
HGB BLD-MCNC: 15.5 G/DL (ref 12–16)
IMM GRANULOCYTES # BLD AUTO: 0.08 K/UL (ref 0–0.11)
IMM GRANULOCYTES NFR BLD AUTO: 0.6 % (ref 0–0.9)
LYMPHOCYTES # BLD AUTO: 2.51 K/UL (ref 1–4.8)
LYMPHOCYTES NFR BLD: 18.4 % (ref 22–41)
MCH RBC QN AUTO: 27.2 PG (ref 27–33)
MCHC RBC AUTO-ENTMCNC: 32.3 G/DL (ref 33.6–35)
MCV RBC AUTO: 84.4 FL (ref 81.4–97.8)
MONOCYTES # BLD AUTO: 0.6 K/UL (ref 0–0.85)
MONOCYTES NFR BLD AUTO: 4.4 % (ref 0–13.4)
NEUTROPHILS # BLD AUTO: 10.32 K/UL (ref 2–7.15)
NEUTROPHILS NFR BLD: 75.6 % (ref 44–72)
NRBC # BLD AUTO: 0 K/UL
NRBC BLD-RTO: 0 /100 WBC
PLATELET # BLD AUTO: 342 K/UL (ref 164–446)
PMV BLD AUTO: 9.5 FL (ref 9–12.9)
POTASSIUM SERPL-SCNC: 3.7 MMOL/L (ref 3.6–5.5)
PROT SERPL-MCNC: 7.9 G/DL (ref 6–8.2)
RBC # BLD AUTO: 5.69 M/UL (ref 4.2–5.4)
SODIUM SERPL-SCNC: 140 MMOL/L (ref 135–145)
TROPONIN T SERPL-MCNC: <6 NG/L (ref 6–19)
WBC # BLD AUTO: 13.7 K/UL (ref 4.8–10.8)

## 2022-02-04 PROCEDURE — 36415 COLL VENOUS BLD VENIPUNCTURE: CPT

## 2022-02-04 PROCEDURE — 93005 ELECTROCARDIOGRAM TRACING: CPT

## 2022-02-04 PROCEDURE — 84484 ASSAY OF TROPONIN QUANT: CPT

## 2022-02-04 PROCEDURE — 80053 COMPREHEN METABOLIC PANEL: CPT

## 2022-02-04 PROCEDURE — 85025 COMPLETE CBC W/AUTO DIFF WBC: CPT

## 2022-02-04 PROCEDURE — 71045 X-RAY EXAM CHEST 1 VIEW: CPT

## 2022-02-04 PROCEDURE — 93005 ELECTROCARDIOGRAM TRACING: CPT | Performed by: EMERGENCY MEDICINE

## 2022-02-04 PROCEDURE — 99284 EMERGENCY DEPT VISIT MOD MDM: CPT

## 2022-02-04 ASSESSMENT — FIBROSIS 4 INDEX: FIB4 SCORE: 1.32

## 2022-02-04 ASSESSMENT — LIFESTYLE VARIABLES: DO YOU DRINK ALCOHOL: NO

## 2022-02-04 NOTE — ED NOTES
Discharge teaching and paperwork provided and all questions/concerns answered. VSS, carduac assessment stable and PIV removed. Patient discharged to the care of self/significant other and ambulated out of the ED.

## 2022-02-04 NOTE — ED TRIAGE NOTES
"Chief Complaint   Patient presents with   • Chest Pain     This AM after muscle spasms, L side of chest radiating to LUE, constant since onset, 'feels like a contracted muscle', hx HTN and pre DM   • Muscle Spasms     Pt woke from sleep with 'full body muscle spasms', lasted for 1.5 hours then sx resolved but changed to chest pain     Pt ambulatory to triage for above complaints. States family at home has been sick recently but family had negative covid test, pt denies recent illness herself, hx of covid in October 2021.     VSS on RA, GCS 15, NAD.    Pt returned to lobby. Educated on triage process and to inform staff of any changes.     BP (!) 162/113   Pulse 92   Temp 36.4 °C (97.6 °F) (Temporal)   Resp 20   Ht 1.575 m (5' 2\")   Wt 95 kg (209 lb 7 oz)   SpO2 97%   BMI 38.31 kg/m²      "

## 2022-02-04 NOTE — ED PROVIDER NOTES
"ED Provider Note    CHIEF COMPLAINT  Chief Complaint   Patient presents with   • Chest Pain     This AM after muscle spasms, L side of chest radiating to LUE, constant since onset, 'feels like a contracted muscle', hx HTN and pre DM   • Muscle Spasms     Pt woke from sleep with 'full body muscle spasms', lasted for 1.5 hours then sx resolved but changed to chest pain       HPI  Katharine Arvizu is a 46 y.o. female who presents complaining of chest pain and muscle spasms.  She woke about 245, stretching got up to take the dog out.  She felt like her heart was racing and she had \"weird muscle spasms\" which started in her legs and up her entire body up to her jaw which was chattering.  This lasted about an hour and a half and then suddenly stopped.  At that time she noted she had some discomfort in her chest which was still present.  The pressure sensation.  He did not feel too deep, and she is not sure if it may be related to muscles as she does have issues with back muscle pain and stretching around to the front.  This pain has been there for about 5 hours, it is improving over that time.  There is no shortness of breath or pleuritic symptoms.  No tearing sensation.  She is never had this before.  There is been no recent trips or travel.  No cough or cold symptoms.  Her family was sick couple weeks ago but she did not get sick; daughter was checked and was found to be negative for SARS-CoV-2.  There is been no leg pain or swelling.  No syncope or near syncope.  She has never had this before.  Patient does exercise 3 times per week on an stationary bike.  There is no exertional symptoms.  She points out to just rode yesterday, about 20 minutes, and felt great.  There is no other complaint.     points out that he use a home blood pressure cuff, and it seemed to be giving an irregular pulse reading; the patient is not so sure about that however.    PAST MEDICAL HISTORY  Past Medical History:   Diagnosis Date " "  • Diabetes (HCC)     gestational only   • Heart burn    • Hypertension     only during pregnancy   • Influenza A    • Sinus infection        FAMILY HISTORY  No early CAD    SOCIAL HISTORY  Social History     Tobacco Use   • Smoking status: Never Smoker   • Smokeless tobacco: Never Used   Substance Use Topics   • Alcohol use: Yes     Comment: 3 x year   • Drug use: No         SURGICAL HISTORY  Past Surgical History:   Procedure Laterality Date   • DEV BY LAPAROSCOPY N/A 3/10/2017    Procedure: DEV BY LAPAROSCOPY;  Surgeon: Uli Cotto M.D.;  Location: SURGERY [facility] DAY [facility];  Service:    • PRIMARY C SECTION  3/22/2016    Procedure: PRIMARY C SECTION;  Surgeon: Alma Rosa Stark M.D.;  Location: LABOR AND DELIVERY;  Service:    • SEPTOPLASTY     • SINUSOTOMIES     • TONSILLECTOMY         CURRENT MEDICATIONS  Home Medications     Reviewed by Rogelio Gaston R.N. (Registered Nurse) on 02/04/22 at 0831  Med List Status: <None>   Medication Last Dose Status   hydrocodone-acetaminophen (NORCO) 5-325 MG Tab per tablet  Active   ibuprofen (MOTRIN) 800 MG Tab  Active                I have reviewed the nurses notes and/or the list brought with the patient.    ALLERGIES  No Known Allergies    REVIEW OF SYSTEMS  See HPI for further details. Review of systems as above, otherwise all other systems are negative.     PHYSICAL EXAM  VITAL SIGNS: BP (!) 162/113   Pulse 92   Temp 36.4 °C (97.6 °F) (Temporal)   Resp 20   Ht 1.575 m (5' 2\")   Wt 95 kg (209 lb 7 oz)   SpO2 97%   BMI 38.31 kg/m²   Triage blood pressure is noted.  Upon recheck, is 130/80.  Constitutional: Well appearing patient in no acute distress.  Not toxic, nor ill in appearance.  HENT: Mucus membranes moist.  Oropharynx is clear.  Eyes: Pupils equally round.  No scleral icterus.   Neck: Full nontender range of motion.  Lymphatic: No cervical lymphadenopathy noted.   Cardiovascular: Regular heart rate and rhythm.  No murmurs, rubs, nor gallop " appreciated.   Thorax & Lungs: Chest is nontender.  Lungs are clear to auscultation with good air movement bilaterally.  No wheeze, rhonchi, nor rales.   Abdomen: Soft, with no tenderness, rebound nor guarding.  No mass, pulsatile mass, nor hepatosplenomegaly appreciated.  Skin: No purpura nor petechia noted.  Extremities/Musculoskeletal: No sign of trauma.  Calves are nontender with no cords nor edema.  No Gallito's sign.  Pulses are intact all around.   Neurologic: Alert & oriented.  Strength and sensation is intact all around.  Gait is normal.  Psychiatric: Normal affect appropriate for the clinical situation.    EKG  I interpreted this EKG myself.  This is a 12-lead study.  The rhythm is sinus with a rate of 79.  There are no ST segment nor T wave abnormalities.  Interpretation: No ST segment elevation myocardial infarction.    LABS  Labs Reviewed   CBC WITH DIFFERENTIAL - Abnormal; Notable for the following components:       Result Value    WBC 13.7 (*)     RBC 5.69 (*)     Hematocrit 48.0 (*)     MCHC 32.3 (*)     Neutrophils-Polys 75.60 (*)     Lymphocytes 18.40 (*)     Neutrophils (Absolute) 10.32 (*)     All other components within normal limits   COMP METABOLIC PANEL - Abnormal; Notable for the following components:    Glucose 101 (*)     Alkaline Phosphatase 114 (*)     All other components within normal limits   TROPONIN   ESTIMATED GFR         RADIOLOGY/PROCEDURES  I have reviewed the patient's film interpretations myself, and they are read out by the radiologist as:   DX-CHEST-PORTABLE (1 VIEW)   Final Result      No acute cardiopulmonary disease evident.        .    MEDICAL RECORD  I have reviewed patient's medical record and pertinent results are listed above.    COURSE & MEDICAL DECISION MAKING  I have reviewed any medical record information, laboratory studies and radiographic results as noted above.  This patient presents with muscle spasms throughout her whole body now with residual left-sided  chest pain patient had this for 5 hours.  This does not sound cardiac in etiology.  Her heart score is one-point for her age, putting her in the low risk category.  EKG is nondiagnostic.  Laboratories are unrevealing.  She does have a mild leukocytosis of uncertain etiology.  Chest x-ray shows no infiltrate.  I went over this with her and her  at the bedside.  She has no symptoms elsewhere to suggest infectious etiology.  Troponin is negative with over 5 hours of symptoms.  He had on the monitor for over 90 minutes, has been no ectopy at all.  She is negative for pulmonary meds by the PE rule out criteria.  This does not sound like aortic etiology.    As I discussed with her, I am not sure as etiology of her symptoms.  I think it is safe for her to be discharged home however.  Given those palpitations and a questionable irregularity of her pulse rate, I wonder if this could have been a tachyarrhythmia, however again this is not at all clear.  At this point we will go ahead and discharge her home in the care of her .  He will be keeping an eye on her.  Put an order in for her to follow-up with cardiology.  Instructions on palpitations, muscle spasms and nonspecific chest pain.  They are to return for new symptoms or any turn for the worse.      FINAL IMPRESSION  1. Precordial chest pain           This dictation was created using voice recognition software.    Electronically signed by: Jourdan Hagan M.D., 2/4/2022 9:32 AM

## 2022-02-04 NOTE — DISCHARGE INSTRUCTIONS
Like we talked about, the cause of your symptoms is not clear.  This could be early disease process and was simply not able to identify at this time.  However, with those palpitations and potentially irregular heartbeats on the BP monitor, I think it is worthwhile to follow-up for recheck with cardiology for their input.  I put an order in for them to follow-up with you.  If you have not heard from them by Monday afternoon, go ahead and give them a call.    In the meantime, return here for new symptoms or any turn for the worse.

## 2022-02-17 ENCOUNTER — TELEPHONE (OUTPATIENT)
Dept: CARDIOLOGY | Facility: MEDICAL CENTER | Age: 47
End: 2022-02-17
Payer: COMMERCIAL

## 2022-02-17 NOTE — TELEPHONE ENCOUNTER
Spoke to patient in regards to records for NP appointment with . Patient has never been treated by a cardiologist, all recent records in epic including labs and imaging. Confirmed patient's appointment date, time and location.

## 2023-06-12 NOTE — PROGRESS NOTES
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 who elicited a history of snoring and fatigue.    The patient describes loud snoring, daytime fatigue, sleep, and leg jerks as her main problems.  Symptoms have been present for approximately 5 years.  The patient usually goes bed at 10 PM and  Wakes up at 7 AM.  She thinks her sleep latency is a few minutes.\"  She experiences 1-3 nocturnal awakenings.  She feels to feel refreshed in the morning.  She does not nap.  She has a regular bed partner and may watch TV in bed she also has pets in the bedroom and they keep the room temperature 74 °F.    Hasn't slept well since her last childbirth but has gained weight since then.Feels that her jaw drops when she gets drowsy and may contribute to airy maintenance. Is a mouth  breather as she is usually stuffy. Uses a  for clenching.        She drinks 1 cup of coffee each day.  No alcohol, THC, CBD, or tobacco products.  She has no all the time symptoms.  Her most of the time symptoms include sleepiness during the day, fatigue, snoring, and difficulty breathing while lying flat on her back.    Past medical history significant for misaligned jaw,  impaired fasting glucose, polycythemia, sinus problems, tonsillectomy, suppurative cholecystitis, nasal surgery,        Patient Active Problem List    Diagnosis Date Noted    Biliary calculus 03/10/2017    Suppurative cholecystitis 03/10/2017       Past Medical History:   Diagnosis Date    Diabetes (HCC)     gestational only    Heart burn     Hypertension     only during pregnancy    Influenza A     Restless leg syndrome     Sinus infection         Past Surgical History:   Procedure Laterality Date    DEV BY LAPAROSCOPY N/A 03/10/2017    Procedure: DEV BY LAPAROSCOPY;  Surgeon: Uli Cotto M.D.;  Location: SURGERY SAME DAY [facility];  " "Service:     PRIMARY C SECTION  03/22/2016    Procedure: PRIMARY C SECTION;  Surgeon: Alma Rosa Stark M.D.;  Location: LABOR AND DELIVERY;  Service:     SEPTOPLASTY      SINUSOTOMIES      TONSILLECTOMY      TONSILLECTOMY         History reviewed. No pertinent family history.    Social History     Socioeconomic History    Marital status:      Spouse name: Not on file    Number of children: Not on file    Years of education: Not on file    Highest education level: Not on file   Occupational History    Not on file   Tobacco Use    Smoking status: Never    Smokeless tobacco: Never   Vaping Use    Vaping Use: Never used   Substance and Sexual Activity    Alcohol use: Yes     Comment: 3 x year    Drug use: No    Sexual activity: Not on file   Other Topics Concern    Not on file   Social History Narrative    Not on file     Social Determinants of Health     Financial Resource Strain: Not on file   Food Insecurity: Not on file   Transportation Needs: Not on file   Physical Activity: Not on file   Stress: Not on file   Social Connections: Not on file   Intimate Partner Violence: Not on file   Housing Stability: Not on file       Current Outpatient Medications   Medication Sig Dispense Refill    ibuprofen (MOTRIN) 800 MG Tab       hydrocodone-acetaminophen (NORCO) 5-325 MG Tab per tablet Take 1-2 Tabs by mouth every 6 hours as needed. (Patient not taking: Reported on 10/4/2021) 30 Tab 0     No current facility-administered medications for this visit.    \"CURRENT RX\"    ALLERGIES: Patient has no known allergies.    ROS    Constitutional: Denies fever, chills, sweats,  weight loss, fatigue.  Eyes: Denies vision loss, pain, drainage, double vision, glasses.  Ears/Nose/Mouth/Throat: Denies earache, difficulty hearing, rhinitis/nasal congestion, injury, recurrent sore throat, persistent hoarseness, decayed teeth/toothaches, ringing or buzzing in the ears.  Cardiovascular: Denies chest pain, tightness, palpitations, swelling " "in legs/feet, fainting, difficulty breathing when lying down but gets better when sitting up.   Respiratory: Denies shortness of breath, cough, sputum, wheezing, painful breathing, coughing up blood.   Sleep: per HPI  Gastrointestinal: Denies  difficulty swallowing, nausea, abdominal pain, diarrhea, constipation, heartburn.  Genitourinary: Denies  blood in urine, discharge, frequent urination.   Musculoskeletal: Denies painful joints, sore muscles, back pain.   Integumentary: Denies rashes, lumps, color changes.   Neurological: Denies frequent headaches,weakness, dizziness.    PHYSICAL EXAM  obese    /84 (BP Location: Left arm, Patient Position: Sitting, BP Cuff Size: Adult)   Pulse 100   Resp 16   Ht 1.575 m (5' 2\")   Wt 97.9 kg (215 lb 14.4 oz)   SpO2 98%   BMI 39.49 kg/m²   Appearance: Well-nourished, well-developed, no acute distress  Eyes:  PERRLA, EOMI  ENMT: Mallampati 1  Neck: Supple, trachea midline, no masses  Respiratory effort:  No intercostal retractions or use of accessory muscles  Lung auscultation:  No wheezes rhonchi rubs or rales  Cardiac: No murmurs, rubs, or gallops; regular rhythm, normal rate; no edema  Abdomen:  No tenderness, no organomegaly. Obese.  Musculoskeletal:  Grossly normal; gait and station normal; digits and nails normal  Skin:  No rashes, petechiae, cyanosis  Neurologic: without focal signs; oriented to person, time, place, and purpose; judgement intact  Psychiatric:  No depression, anxiety, agitation        Medical Decision Making:  The medical record was reviewed in its entirety including the referral notes, records from primary care, consultants notes, hospital records, labs, imaging, microbiology, immunology, and immunizations.  Care gaps identified and reviewed with the patient.    Diagnostic and titration nocturnal polysomnograms, home sleep apnea tests, continuous nocturnal oximetry results, multiple sleep latency tests, and compliance reports " "reviewed.        1. MIGUELANGEL (obstructive sleep apnea)  - Overnight Home Sleep Study; Future      PLAN:   The patient has signs and symptoms consistent with obstructive sleep apnea hypopnea syndrome. Will schedule a home sleep test.    The risks of untreated sleep apnea were discussed with the patient at length. Patients with MIGUELANGEL are at increased risk of cardiovascular disease including systemic arterial hypertension, pulmonary arterial hypertension (transient or fixed), TIA, and an elevated risk of stroke, heart attack, sudden death, or arrhythmia between the hours of 11 PM and 6 AM. MIGUELANGEL patients have an increased risk of motor vehicle accidents, type 2 diabetes, GERD, repetitive mechanical trauma to pharyngeal muscles with inflammation and denervation, frequent EEG arousals leading to nonrestorative sleep, excessive daytime sleepiness, fatigue, depression, poor pain control, irritability, and lower quality of life.  The patient was advised to avoid driving a motor vehicle when drowsy.    Positive airway pressure will favorably impact many of the adverse conditions and effects provoked by MIGUELANGEL.    Have advised the patient to follow up with the appropriate healthcare practitioners for all other medical problems and issues. Discussed blood pressure management if needed. Discussed depression screening.        Return for after sleep study.    Total time 45 minutes      She lives in [place] so using a mail in device i.e. \"React\" may be most convenient.              "

## 2023-06-13 ENCOUNTER — OFFICE VISIT (OUTPATIENT)
Dept: SLEEP MEDICINE | Facility: MEDICAL CENTER | Age: 48
End: 2023-06-13
Attending: INTERNAL MEDICINE
Payer: COMMERCIAL

## 2023-06-13 VITALS
WEIGHT: 215.9 LBS | HEIGHT: 62 IN | RESPIRATION RATE: 16 BRPM | OXYGEN SATURATION: 98 % | DIASTOLIC BLOOD PRESSURE: 84 MMHG | SYSTOLIC BLOOD PRESSURE: 128 MMHG | HEART RATE: 100 BPM | BODY MASS INDEX: 39.73 KG/M2

## 2023-06-13 DIAGNOSIS — G47.33 OSA (OBSTRUCTIVE SLEEP APNEA): ICD-10-CM

## 2023-06-13 PROCEDURE — 3074F SYST BP LT 130 MM HG: CPT | Performed by: INTERNAL MEDICINE

## 2023-06-13 PROCEDURE — 99212 OFFICE O/P EST SF 10 MIN: CPT | Performed by: INTERNAL MEDICINE

## 2023-06-13 PROCEDURE — 3079F DIAST BP 80-89 MM HG: CPT | Performed by: INTERNAL MEDICINE

## 2023-06-13 PROCEDURE — 99204 OFFICE O/P NEW MOD 45 MIN: CPT | Performed by: INTERNAL MEDICINE

## 2023-06-13 ASSESSMENT — FIBROSIS 4 INDEX: FIB4 SCORE: 0.7

## 2023-06-13 ASSESSMENT — PATIENT HEALTH QUESTIONNAIRE - PHQ9: CLINICAL INTERPRETATION OF PHQ2 SCORE: 0

## 2023-06-14 ENCOUNTER — TELEPHONE (OUTPATIENT)
Dept: HEALTH INFORMATION MANAGEMENT | Facility: OTHER | Age: 48
End: 2023-06-14
Payer: COMMERCIAL

## 2023-09-01 ENCOUNTER — OFFICE VISIT (OUTPATIENT)
Dept: SLEEP MEDICINE | Facility: MEDICAL CENTER | Age: 48
End: 2023-09-01
Attending: PHYSICIAN ASSISTANT
Payer: COMMERCIAL

## 2023-09-01 VITALS
BODY MASS INDEX: 39.56 KG/M2 | SYSTOLIC BLOOD PRESSURE: 124 MMHG | WEIGHT: 215 LBS | HEIGHT: 62 IN | OXYGEN SATURATION: 97 % | HEART RATE: 82 BPM | RESPIRATION RATE: 16 BRPM | DIASTOLIC BLOOD PRESSURE: 78 MMHG

## 2023-09-01 DIAGNOSIS — G47.33 OSA (OBSTRUCTIVE SLEEP APNEA): ICD-10-CM

## 2023-09-01 PROCEDURE — 99212 OFFICE O/P EST SF 10 MIN: CPT | Performed by: PHYSICIAN ASSISTANT

## 2023-09-01 PROCEDURE — 99213 OFFICE O/P EST LOW 20 MIN: CPT | Performed by: PHYSICIAN ASSISTANT

## 2023-09-01 PROCEDURE — 3074F SYST BP LT 130 MM HG: CPT | Performed by: PHYSICIAN ASSISTANT

## 2023-09-01 PROCEDURE — 3078F DIAST BP <80 MM HG: CPT | Performed by: PHYSICIAN ASSISTANT

## 2023-09-01 ASSESSMENT — ENCOUNTER SYMPTOMS
COUGH: 0
INSOMNIA: 0
HEARTBURN: 1
SHORTNESS OF BREATH: 0
WEIGHT LOSS: 0
ORTHOPNEA: 1
WHEEZING: 0
HEADACHES: 1
DIZZINESS: 0
CHILLS: 0
SORE THROAT: 0
TREMORS: 0
FEVER: 0
SINUS PAIN: 0
SPUTUM PRODUCTION: 1
PALPITATIONS: 0

## 2023-09-01 ASSESSMENT — FIBROSIS 4 INDEX: FIB4 SCORE: 0.72

## 2023-09-01 NOTE — PATIENT INSTRUCTIONS
1-reviewed overnight sleep study results   2-consistent with severe obstructive sleep apnea  3-will proceed with auto cpap therapy at this time  4-will send order to [facility]  5-will need 30-60 day of usage and appointment   6-strategies for success   Minimum usage guidelines of 4 hours avg and 70% of days   Training period of 30 min to 120 min in the evening to get acclimated to device   Transition to minimum of 4 hours per night  7-bring entire device to clinic for first compliance appointment  8-careful mask fit is always recommended

## 2023-09-01 NOTE — PROGRESS NOTES
"Chief Complaint   Patient presents with    Follow-Up    Results     SS       HPI:  Katharine Arvizu is a 48 y.o. year old female here today for follow-up on sleep study results.  Patient previously evaluated in clinic 6/13/2023 by Dr. Montana Atkins.    Past Medical History: Biliary calculus, superlative cholecystitis.  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    Vitals:  /78   Pulse 82   Resp 16   Ht 1.575 m (5' 2\")   Wt 97.5 kg (215 lb)   SpO2 97%     Recent Imaging: None    Reviewed home sleep study obtained 7/9/2023 and 7/10/2023 demonstrating overall AHI of 53.1 events per hour, low O2 sat of 78%, sats less than or equal to 89% for less than 2 minutes of total sleep time.  Findings are consistent with severe MIGUELANGEL CPAP titration study or auto CPAP trial recommended.    Sleep schedule goes to bed 10 PM, wakens 7 AM , and gets up during the night 1-3 times.  Denies napping during the day.   Symptoms  include loud snoring, daytime fatigue, leg jerks , does not feel refreshed in the morning.  She does use a  for jaw clenching.    [place] Sleepiness Scale reported as 5/24 on 6/13/2023.          Review of Systems   Constitutional:  Positive for malaise/fatigue (mild +). Negative for chills, fever and weight loss.   HENT:  Positive for congestion (chronic sinus issues) and tinnitus. Negative for hearing loss, nosebleeds, sinus pain and sore throat.    Eyes:         Presc glasses   Respiratory:  Positive for sputum production (post nasal drip). Negative for cough, shortness of breath and wheezing.    Cardiovascular:  Positive for orthopnea and leg swelling (with road trips). Negative for chest pain and palpitations.   Gastrointestinal:  Positive for heartburn (occasional).        No dentures, missing canine teeth, no swallowing issues    Neurological:  Positive for headaches (occasional). Negative for dizziness and tremors.   Psychiatric/Behavioral:  The patient does not have " insomnia.        Past Medical History:   Diagnosis Date    Diabetes (HCC)     gestational only    Heart burn     Hypertension     only during pregnancy    Influenza A     Restless leg syndrome     Sinus infection        Past Surgical History:   Procedure Laterality Date    DEV BY LAPAROSCOPY N/A 03/10/2017    Procedure: DEV BY LAPAROSCOPY;  Surgeon: Uli Cotto M.D.;  Location: SURGERY SAME DAY [facility];  Service:     PRIMARY C SECTION  03/22/2016    Procedure: PRIMARY C SECTION;  Surgeon: Alma Rosa Stark M.D.;  Location: LABOR AND DELIVERY;  Service:     SEPTOPLASTY      SINUSOTOMIES      TONSILLECTOMY      TONSILLECTOMY         History reviewed. No pertinent family history.    Social History     Socioeconomic History    Marital status:      Spouse name: Not on file    Number of children: Not on file    Years of education: Not on file    Highest education level: Not on file   Occupational History    Not on file   Tobacco Use    Smoking status: Never    Smokeless tobacco: Never   Vaping Use    Vaping Use: Never used   Substance and Sexual Activity    Alcohol use: Yes     Comment: 3 x year    Drug use: No    Sexual activity: Not on file   Other Topics Concern    Not on file   Social History Narrative    Not on file     Social Determinants of Health     Financial Resource Strain: Not on file   Food Insecurity: Not on file   Transportation Needs: Not on file   Physical Activity: Not on file   Stress: Not on file   Social Connections: Not on file   Intimate Partner Violence: Not on file   Housing Stability: Not on file       Allergies as of 09/01/2023    (No Known Allergies)          Current medications as of today   Current Outpatient Medications   Medication Sig Dispense Refill    hydrocodone-acetaminophen (NORCO) 5-325 MG Tab per tablet Take 1-2 Tabs by mouth every 6 hours as needed. (Patient not taking: Reported on 10/4/2021) 30 Tab 0     No current facility-administered medications for this  visit.         Physical Exam:   Gen:           Alert and oriented, No apparent distress. Mood and affect appropriate, normal interaction with examiner.   Hearing:     Grossly intact.  Nose:          Normal, no lesions or deformities.  Dentition:    Good dentition.   Oropharynx:   Tongue normal, posterior pharynx without erythema or exudate.  Mallampati Classification: I  Neck:        Supple, trachea midline, no masses.  Respiratory Effort: No intercostal retractions or use of accessory muscles.   Gait and Station: Normal.  Digits and Nails: No clubbing, cyanosis, petechiae, or nodes.   Skin:        No rashes, lesions or ulcers noted.               Ext:           No cyanosis or edema.      Immunizations:  Flu: Recommended  SARS CoV2 Vaccine: Recommended    Assessment / Plan:  1. MIGUELANGEL (obstructive sleep apnea)  - DME CPAP    Reviewed sleep study results, severe MIGUELANGEL.  Reviewed risks associated with untreated or undertreated sleep apnea.  Patient wishes to proceed with auto CPAP therapy at this time.  Orders to be sent to [facility] in [place].  Patient understands she will need a short-term follow-up with 30 to 60 days of usage data to review.  Reviewed strategies for success including minimum usage requirements, training and acclimation period.  Patient instructed to bring entire device to clinic for first compliance appointment.  Careful mask fit is recommended.        Follow-up:   Return in about 3 months (around 12/1/2023) for Return with Dariana Guzman PA-C.    Please note that this dictation was created using voice recognition software. I have made every reasonable attempt to correct obvious errors, but it is possible there are errors of grammar and possibly content that I did not discover before finalizing the note.

## 2023-10-16 ENCOUNTER — PATIENT MESSAGE (OUTPATIENT)
Dept: SLEEP MEDICINE | Facility: MEDICAL CENTER | Age: 48
End: 2023-10-16

## 2023-12-08 ENCOUNTER — APPOINTMENT (OUTPATIENT)
Dept: SLEEP MEDICINE | Facility: MEDICAL CENTER | Age: 48
End: 2023-12-08
Attending: PHYSICIAN ASSISTANT
Payer: COMMERCIAL

## 2023-12-21 NOTE — PATIENT COMMUNICATION
Called patient to let them know they will need to schedule between 30-90 days after they have the device.

## 2024-04-01 ENCOUNTER — OFFICE VISIT (OUTPATIENT)
Dept: SLEEP MEDICINE | Facility: MEDICAL CENTER | Age: 49
End: 2024-04-01
Attending: PHYSICIAN ASSISTANT
Payer: COMMERCIAL

## 2024-04-01 VITALS
OXYGEN SATURATION: 97 % | HEIGHT: 62 IN | WEIGHT: 215 LBS | SYSTOLIC BLOOD PRESSURE: 118 MMHG | DIASTOLIC BLOOD PRESSURE: 76 MMHG | HEART RATE: 97 BPM | BODY MASS INDEX: 39.56 KG/M2 | RESPIRATION RATE: 16 BRPM

## 2024-04-01 DIAGNOSIS — G47.33 OSA (OBSTRUCTIVE SLEEP APNEA): ICD-10-CM

## 2024-04-01 DIAGNOSIS — E66.9 OBESITY, CLASS II, BMI 35-39.9: ICD-10-CM

## 2024-04-01 PROCEDURE — 99212 OFFICE O/P EST SF 10 MIN: CPT | Performed by: PHYSICIAN ASSISTANT

## 2024-04-01 PROCEDURE — 99213 OFFICE O/P EST LOW 20 MIN: CPT | Performed by: PHYSICIAN ASSISTANT

## 2024-04-01 PROCEDURE — 3074F SYST BP LT 130 MM HG: CPT | Performed by: PHYSICIAN ASSISTANT

## 2024-04-01 PROCEDURE — 3078F DIAST BP <80 MM HG: CPT | Performed by: PHYSICIAN ASSISTANT

## 2024-04-01 ASSESSMENT — ENCOUNTER SYMPTOMS
SINUS PAIN: 1
SHORTNESS OF BREATH: 0
CHILLS: 0
INSOMNIA: 0
SPUTUM PRODUCTION: 0
HEADACHES: 1
DIZZINESS: 0
TREMORS: 0
FEVER: 0
WHEEZING: 0
WEIGHT LOSS: 0
ORTHOPNEA: 0
SORE THROAT: 0
COUGH: 0
PALPITATIONS: 0
HEARTBURN: 1

## 2024-04-01 NOTE — PROGRESS NOTES
"Chief Complaint   Patient presents with    Follow-Up     Apnea // Last Seen 9/1/2023     Other     Set up on machine 1/4/2024        HPI:  Katharine Arvizu is a 48 y.o. year old female here today for follow-up on first compliance.    Past Medical History: Suppurative cholecystitis, heartburn, hypertension, restless leg syndrome, obesity.  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    Vitals:  /76 (BP Location: Left arm, Patient Position: Sitting, BP Cuff Size: Adult)   Pulse 97   Resp 16   Ht 1.575 m (5' 2\")   Wt 97.5 kg (215 lb)   SpO2 97% BMI of 39.32 kg/m²    Recent Imaging: None    Currently using  Resmed auto CPAP @5-15 cm H20 pressure; compliance reviewed for 1/3/2024 through 3/30/2024, days used 44/88 or 50%, average daily usage 3 hours 45 minutes, 25% of days greater than or equal to 4 hours, mask leak at 15.8 LPM at 95th percentile, AHI 0.5 per hour.  Patient is struggling with use and tolerance.  See media for full report    Device obtained 1/4/2024   DME provider [facility] in [place]  Mask interface hybrid full face mask     Overnight home sleep test obtained 7/9/2023 and 7/10/2023 demonstrating overall AHI of 53.1 events per hour, low O2 sat of 78%, sats less than or equal to 89% for less than 2 minutes of total sleep time. Findings are consistent with severe MIGUELANGEL, CPAP titration study or auto CPAP trial recommended.     Sleep schedule goes to sleep 10-10:30 PM in the recliner, transfers to bed about 12 AM and gets up at 8 AM on average.  Symptoms  reports improved fatigue, decreased snoring, resolution mild morning headaches.    [place] Sleepiness Scale reported as 5/24 on 6/13/2023        Review of Systems   Constitutional:  Negative for chills, fever, malaise/fatigue and weight loss.   HENT:  Positive for congestion (frequent), sinus pain (sensitive to air pressure) and tinnitus (from left jaw discomfort). Negative for hearing loss, nosebleeds and sore throat.  "   Eyes:         Presc glasses     Respiratory:  Negative for cough, sputum production, shortness of breath and wheezing.    Cardiovascular:  Negative for chest pain, palpitations, orthopnea and leg swelling.   Gastrointestinal:  Positive for heartburn (occasional).        No dentures, missing two canines, no swallowing issues    Neurological:  Positive for headaches (occasional). Negative for dizziness and tremors.   Psychiatric/Behavioral:  The patient does not have insomnia.        Past Medical History:   Diagnosis Date    Diabetes (HCC)     gestational only    Heart burn     Hypertension     only during pregnancy    Influenza A     Restless leg syndrome     Sinus infection        Past Surgical History:   Procedure Laterality Date    DEV BY LAPAROSCOPY N/A 03/10/2017    Procedure: DEV BY LAPAROSCOPY;  Surgeon: Uli Cotto M.D.;  Location: SURGERY SAME DAY [facility];  Service:     PRIMARY C SECTION  03/22/2016    Procedure: PRIMARY C SECTION;  Surgeon: Alma Rosa Stark M.D.;  Location: LABOR AND DELIVERY;  Service:     SEPTOPLASTY      SINUSOTOMIES      TONSILLECTOMY      TONSILLECTOMY         History reviewed. No pertinent family history.    Social History     Socioeconomic History    Marital status:      Spouse name: Not on file    Number of children: Not on file    Years of education: Not on file    Highest education level: Not on file   Occupational History    Not on file   Tobacco Use    Smoking status: Never    Smokeless tobacco: Never   Vaping Use    Vaping Use: Never used   Substance and Sexual Activity    Alcohol use: Yes     Comment: 3 x year    Drug use: No    Sexual activity: Not on file   Other Topics Concern    Not on file   Social History Narrative    Not on file     Social Determinants of Health     Financial Resource Strain: Not on file   Food Insecurity: Not on file   Transportation Needs: Not on file   Physical Activity: Not on file   Stress: Not on file   Social Connections:  Not on file   Intimate Partner Violence: Not on file   Housing Stability: Not on file       Allergies as of 04/01/2024    (No Known Allergies)          Current medications as of today   Current Outpatient Medications   Medication Sig Dispense Refill    hydrocodone-acetaminophen (NORCO) 5-325 MG Tab per tablet Take 1-2 Tabs by mouth every 6 hours as needed. (Patient not taking: Reported on 4/1/2024) 30 Tab 0     No current facility-administered medications for this visit.         Physical Exam:   Gen:           Alert and oriented, No apparent distress. Mood and affect appropriate, normal interaction with examiner.   Hearing:     Grossly intact.  Nose:          Normal, no lesions or deformities.  Dentition:    fair dentition.   Oropharynx:   Tongue normal, posterior pharynx without erythema or exudate.  Mallampati Classification: I  Neck:        Supple, trachea midline, no masses.  Respiratory Effort: No intercostal retractions or use of accessory muscles.   Gait and Station: Normal.  Digits and Nails: No clubbing, cyanosis, petechiae, or nodes.   Skin:        No rashes, lesions or ulcers noted.               Ext:           No cyanosis or edema.      Immunizations:  Flu: Recommended  SARS CoV2 Vaccine: Recommended    Assessment / Plan:  1. MIGUELANGEL (obstructive sleep apnea)  - DME Other    Reviewed first compliance, patient is struggling with PAP therapy use on this first compliance.  Aggravated in part by ramp settings which trigger break in seal i.e. mask leak.  Ramp setting turned off at this time.  Will send order to allow wireless access to device to [facility] in [place].  Reviewed therapeutic goals and minimum use requirements.  Short-term follow-up to review for improvement.  We did review equipment cleaning, equipment replacement schedule and reminded patient to use distilled water.  Follow-up in 3 months.    2. Obesity, Class II, BMI 35-39.9    Elevated BMI: Discussion regarding impact central adiposity on  pulmonary function.  Encouraged to increase activity as tolerated and monitor nutritional intake.        Follow-up:   Return in about 3 months (around 7/1/2024) for Return with Dariana Guzman PA-C.    Please note that this dictation was created using voice recognition software. I have made every reasonable attempt to correct obvious errors, but it is possible there are errors of grammar and possibly content that I did not discover before finalizing the note.

## 2024-04-01 NOTE — PATIENT INSTRUCTIONS
1-ramp setting triggers break in seal ie mask leak  2-ramp setting turned off at this time  3-send order to allow wireless access [facility] device to [facility] in [place]  4-reviewed therapeutic goals and minimum use requirements  5-short term follow up to review for improvement  6-Today we reviewed equipment cleaning  once weekly minimum  mask, tubing and water chamber  use dedicated container  use mild soap and water  SoClean or other ozone  are not recommended  white vinegar and water solution is no longer recommended  hang tubing to dry  mask sanitizing wipes are an option for use   7-As a reminder use distilled water only in humidifier chamber.    8-Equipment replacement schedule : Mask cushion every month, Head gear every 6 months, Tubing every 3 months, Ultra-fine filters 2 times per month, Humidifier chamber every 6 months  9-follow up in 3 months

## 2024-08-01 ENCOUNTER — APPOINTMENT (OUTPATIENT)
Dept: SLEEP MEDICINE | Facility: MEDICAL CENTER | Age: 49
End: 2024-08-01
Attending: PHYSICIAN ASSISTANT
Payer: COMMERCIAL

## 2024-09-16 ENCOUNTER — APPOINTMENT (OUTPATIENT)
Dept: SLEEP MEDICINE | Facility: MEDICAL CENTER | Age: 49
End: 2024-09-16
Payer: COMMERCIAL

## 2024-09-16 VITALS
SYSTOLIC BLOOD PRESSURE: 122 MMHG | OXYGEN SATURATION: 96 % | HEART RATE: 90 BPM | HEIGHT: 62 IN | WEIGHT: 216 LBS | DIASTOLIC BLOOD PRESSURE: 74 MMHG | BODY MASS INDEX: 39.75 KG/M2 | RESPIRATION RATE: 16 BRPM

## 2024-09-16 DIAGNOSIS — G47.33 OSA (OBSTRUCTIVE SLEEP APNEA): ICD-10-CM

## 2024-09-16 PROCEDURE — 3074F SYST BP LT 130 MM HG: CPT | Performed by: PHYSICIAN ASSISTANT

## 2024-09-16 PROCEDURE — 3078F DIAST BP <80 MM HG: CPT | Performed by: PHYSICIAN ASSISTANT

## 2024-09-16 PROCEDURE — 99213 OFFICE O/P EST LOW 20 MIN: CPT | Performed by: PHYSICIAN ASSISTANT

## 2024-09-16 ASSESSMENT — ENCOUNTER SYMPTOMS
INSOMNIA: 0
PALPITATIONS: 0
COUGH: 0
SORE THROAT: 0
CHILLS: 0
DIZZINESS: 0
HEADACHES: 1
ROS GI COMMENTS: NO DENTURES, TWO MISSING TEETH, NO SWALLOWING ISSUES
SINUS PAIN: 1
SPUTUM PRODUCTION: 0
FEVER: 0
WHEEZING: 0
ORTHOPNEA: 1
TREMORS: 0
HEARTBURN: 1
WEIGHT LOSS: 0
SHORTNESS OF BREATH: 0

## 2024-09-16 NOTE — PROGRESS NOTES
"Chief Complaint   Patient presents with    Apnea     Last Office Visit 4/1/24 with Dariana Guzman P.A.-C.    Settings: auto CPAP @5-15 cm H20         HPI:  Katharine Arvizu is a 49 y.o. year old female here today for follow-up on obstructive sleep apnea.  Last seen in clinic 4/1/2024 by MAURO Marshall.  Previously evaluated by Dr. Montana Atkins 6/13/2023.    Past Medical History: Biliary calculus, suppurative cholecystitis, diabetes, heartburn, hypertension, restless leg syndrome, MIGUELANGEL.  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    Vitals:  /74 (BP Location: Left arm, Patient Position: Sitting, BP Cuff Size: Adult)   Pulse 90   Resp 16   Ht 1.575 m (5' 2\")   Wt 98 kg (216 lb)   SpO2 96% BMI of 39.51 kg/m².    Recent Imaging: none    Currently using  Resmed auto CPAP @5-15 cm H20 pressure; compliance reviewed for 8/10/2024 through 9/8/2024, days used 8/30, average daily usage 4 hours 43 minutes, 20% of days greater than or equal to 4 hours, mask leak at 13.5 LPM at 95th percentile, AHI 1 per hour.  Patient continues to struggle tolerating CPAP, aware of usage recommendations.  Has trialed ramp off and on with no benefit noted.    Device obtained 1/4/2024  DME provider [facility] in [place]  Mask interface hybrid top loading fullface mask    Overnight home sleep test obtained 7/9/2023 and 7/10/2023 demonstrating overall AHI of 53.1 events per hour, low O2 sat of 78%, sats less than or equal to 89% for less than 2 minutes of total sleep time. Findings are consistent with severe MIGUELANGEL, CPAP titration study or auto CPAP trial recommended.     Sleep schedule goes to bed 10-10:30 PM and wakens 8 AM usually falls asleep in the recliner before transferring awakening and into bed.  Symptoms  does note benefit with use however reports using only when she \"needs it\" decrease snoring when used, wears  for teeth clenching.    [place] Sleepiness Scale reported as 5/24 on " 6/13/2023.        Review of Systems   Constitutional:  Negative for chills, fever, malaise/fatigue and weight loss.   HENT:  Positive for sinus pain (chronic sinus issues) and tinnitus (chronic intermittent). Negative for congestion, hearing loss, nosebleeds and sore throat.    Eyes:         Presc glasses   Respiratory:  Negative for cough, sputum production, shortness of breath and wheezing.    Cardiovascular:  Positive for orthopnea (elevated about 2 pillows) and leg swelling. Negative for chest pain and palpitations.   Gastrointestinal:  Positive for heartburn (occasional).        No dentures, two missing teeth, no swallowing issues   Neurological:  Positive for headaches. Negative for dizziness and tremors.   Psychiatric/Behavioral:  The patient does not have insomnia.        Past Medical History:   Diagnosis Date    Diabetes (HCC)     gestational only    Heart burn     Hypertension     only during pregnancy    Influenza A     Restless leg syndrome     Sinus infection        Past Surgical History:   Procedure Laterality Date    DEV BY LAPAROSCOPY N/A 03/10/2017    Procedure: DEV BY LAPAROSCOPY;  Surgeon: Uli Cotto M.D.;  Location: SURGERY SAME DAY [facility];  Service:     PRIMARY C SECTION  03/22/2016    Procedure: PRIMARY C SECTION;  Surgeon: Alma Rosa Stark M.D.;  Location: LABOR AND DELIVERY;  Service:     SEPTOPLASTY      SINUSOTOMIES      TONSILLECTOMY      TONSILLECTOMY         No family history on file.    Social History     Socioeconomic History    Marital status:      Spouse name: Not on file    Number of children: Not on file    Years of education: Not on file    Highest education level: Not on file   Occupational History    Not on file   Tobacco Use    Smoking status: Never    Smokeless tobacco: Never   Vaping Use    Vaping status: Never Used   Substance and Sexual Activity    Alcohol use: Yes     Comment: 3 x year    Drug use: No    Sexual activity: Not on file   Other Topics  Concern    Not on file   Social History Narrative    Not on file     Social Determinants of Health     Financial Resource Strain: Not on file   Food Insecurity: Not on file   Transportation Needs: Not on file   Physical Activity: Not on file   Stress: Not on file (2/10/2021)   Social Connections: Not on file   Intimate Partner Violence: Low Risk  (4/12/2021)    [facility]    Intimate Partner Violence     Insults You: Not on file     Threatens You: Not on file     Screams at You: Not on file     Physically Hurt: Not on file     Intimate Partner Violence Score: Not on file   Housing Stability: Not on file       Allergies as of 09/16/2024    (No Known Allergies)          Current medications as of today   No current outpatient medications on file.     No current facility-administered medications for this visit.         Physical Exam:   Gen:           Alert and oriented, No apparent distress. Mood and affect appropriate, normal interaction with examiner.   Hearing:     Grossly intact.  Nose:          Normal, no lesions or deformities.  Dentition:    Fair dentition.   Oropharynx:   Tongue normal, posterior pharynx without erythema or exudate.  Mallampati Classification: 1  Neck:        Supple, trachea midline, no masses.  Respiratory Effort: No intercostal retractions or use of accessory muscles.   Gait and Station: Normal.  Digits and Nails: No clubbing, cyanosis, petechiae, or nodes.   Skin:        No rashes, lesions or ulcers noted.               Ext:           No cyanosis or edema.      Immunizations:  Flu: Recommended  SARS CoV2 Vaccine: Recommended    Assessment / Plan:  1. MIGUELANGEL (obstructive sleep apnea)    Reviewed last month compliance which was poor, also reviewed longer reports consistent with ongoing struggle with tolerance.  Discussed alternatives for treatment including oral mandibular device which is not recommended and UPPP also not recommended due to severity of patient's  sleep apnea.  Discussed inspire as a possible alternative although patient is not interested in a surgical option.  She does report pronounced oral dryness without severe mask leak, mask leak is an acceptable range using a hybrid fullface mask.  Consider trial XyliMelts and assess for benefit.  Continued and increased efforts are recommended, reviewed risks associated with untreated or undertreated sleep apnea.  Patient very aware.  Reviewed equipment cleaning, equipment replacement schedule and reminded to use distilled water only in humidifier chamber.  Little benefit in short-term follow-up confirmed with the patient.  Follow-up in 1 year, sooner if needed.    Follow-up:   Return in about 11 months (around 8/16/2025) for Return with Dariana Guzman PA-C.    Please note that this dictation was created using voice recognition software. I have made every reasonable attempt to correct obvious errors, but it is possible there are errors of grammar and possibly content that I did not discover before finalizing the note.

## 2025-01-27 NOTE — PATIENT INSTRUCTIONS
1-reviewed last month's compliance which is poor  2-also reviewed longer reports, ongoing struggle with tolerance  3-discussed Inspire as possible alternative, UPPP is not recommended and neither is oral mandibular device due to severity of sleep apnea  4-report pronounced oral dryness without severe mask leak (in acceptable range) using hybrid full face mask  5-trial xylimelts, lubricating lozenges  6-further efforts encouraged  7-reviewed risks associated with untreated or under treated sleep apnea  8-As a reminder use distilled water only in humidifier chamber.    9-Today we reviewed equipment cleaning  once weekly minimum  mask, tubing and water chamber  use dedicated container  use mild soap and water  SoClean or other ozone  are not recommended  white vinegar and water solution is no longer recommended  hang tubing to dry  mask sanitizing wipes are an option for use   10-Equipment replacement schedule : Mask cushion every month, Mask every 6 months, Head gear every 6 months, Tubing every 3 months, Ultra-fine filters 2 times per month, Humidifier chamber every 6 months  11-follow up in one year, sooner if needed    
at least 3-/5, unable to formally assess as pt refused due to c/o pain

## 2025-08-04 ENCOUNTER — OFFICE VISIT (OUTPATIENT)
Dept: SLEEP MEDICINE | Facility: MEDICAL CENTER | Age: 50
End: 2025-08-04
Payer: COMMERCIAL

## 2025-08-04 VITALS
DIASTOLIC BLOOD PRESSURE: 84 MMHG | WEIGHT: 223.1 LBS | RESPIRATION RATE: 16 BRPM | HEIGHT: 62 IN | SYSTOLIC BLOOD PRESSURE: 124 MMHG | OXYGEN SATURATION: 95 % | BODY MASS INDEX: 41.06 KG/M2 | HEART RATE: 99 BPM

## 2025-08-04 DIAGNOSIS — G47.33 OSA (OBSTRUCTIVE SLEEP APNEA): Primary | ICD-10-CM

## 2025-08-04 DIAGNOSIS — R68.2 DRY MOUTH: ICD-10-CM

## 2025-08-04 PROCEDURE — 3074F SYST BP LT 130 MM HG: CPT

## 2025-08-04 PROCEDURE — 99214 OFFICE O/P EST MOD 30 MIN: CPT

## 2025-08-04 PROCEDURE — 3079F DIAST BP 80-89 MM HG: CPT

## (undated) DEVICE — SENSOR SPO2 NEO LNCS ADHESIVE (20/BX) SEE USER NOTES

## (undated) DEVICE — LACTATED RINGERS INJ 1000 ML - (14EA/CA 60CA/PF)

## (undated) DEVICE — HEMOSTAT SURG ABSORBABLE - 4 X 8 IN SURGICEL (24EA/CA)

## (undated) DEVICE — TUBING SETDISPOS HIGH FLOW II - (10/BX)

## (undated) DEVICE — DRAIN J-VAC 10MM FLAT - (10/CA)

## (undated) DEVICE — MASK ANESTHESIA ADULT  - (100/CA)

## (undated) DEVICE — KIT ANESTHESIA W/CIRCUIT & 3/LT BAG W/FILTER (20EA/CA)

## (undated) DEVICE — KIT SURGIFLO W/OUT THROMBIN - (6EA/CA)

## (undated) DEVICE — APPLICATOR SURGIFLO - (6EA/CA)

## (undated) DEVICE — SUTURE 0 COATED VICRYL 6-18IN - (12PK/BX)

## (undated) DEVICE — GOWN SURGEONS X-LARGE - DISP. (30/CA)

## (undated) DEVICE — CLIP MED LG INTNL HRZN TI ESCP - (20/BX)

## (undated) DEVICE — HEAD HOLDER JUNIOR/ADULT

## (undated) DEVICE — SUTURE 0 VICRYL PLUS UR-6 - 27 INCH (36/BX)

## (undated) DEVICE — SUTURE GENERAL

## (undated) DEVICE — GLOVE BIOGEL SZ 7 SURGICAL PF LTX - (50PR/BX 4BX/CA)

## (undated) DEVICE — TROCAR Z THREAD11MM OPTICAL - NON BLADED(6/BX)

## (undated) DEVICE — PROTECTOR ULNA NERVE - (36PR/CA)

## (undated) DEVICE — LEAD SET 6 DISP. EKG NIHON KOHDEN (100EA/CA)

## (undated) DEVICE — CHLORAPREP 26 ML APPLICATOR - ORANGE TINT(25/CA)

## (undated) DEVICE — SUTURE 4-0 VICRYL PLUS FS-2 - 27 INCH (36/BX)

## (undated) DEVICE — SUCTION INSTRUMENT YANKAUER BULBOUS TIP W/O VENT (50EA/CA)

## (undated) DEVICE — TROCAR, CAN&SEAL 5X100MM C0509

## (undated) DEVICE — ELECTRODE DUAL RETURN W/ CORD - (50/PK)

## (undated) DEVICE — BAG RETRIEVAL 10ML (10EA/BX)

## (undated) DEVICE — DERMABOND ADVANCED - (12EA/BX)

## (undated) DEVICE — TUBE CONNECTING SUCTION - CLEAR PLASTIC STERILE 72 IN (50EA/CA)

## (undated) DEVICE — SET EXTENSION WITH 2 PORTS (48EA/CA) ***PART #2C8610 IS A SUBSTITUTE*****

## (undated) DEVICE — PACK LAP CHOLE OR - (2EA/CA)

## (undated) DEVICE — RESERVOIR SUCTION 100 CC - SILICONE (20EA/CA)

## (undated) DEVICE — GLOVE BIOGEL SZ 6.5 SURGICAL PF LTX (50PR/BX 4BX/CA)

## (undated) DEVICE — NEPTUNE 4 PORT MANIFOLD - (20/PK)

## (undated) DEVICE — GVL 3 STAT DISPOSABLE - (10/BX)

## (undated) DEVICE — CANISTER SUCTION RIGID RED 1500CC (40EA/CA)

## (undated) DEVICE — SODIUM CHL IRRIGATION 0.9% 1000ML (12EA/CA)

## (undated) DEVICE — KIT  I.V. START (100EA/CA)

## (undated) DEVICE — TUBING CLEARLINK DUO-VENT - C-FLO (48EA/CA)

## (undated) DEVICE — CATHETER IV 20 GA X 1-1/4 ---SURG.& SDS ONLY--- (50EA/BX)

## (undated) DEVICE — GLOVE BIOGEL INDICATOR SZ 6.5 SURGICAL PF LTX - (50PR/BX 4BX/CA)

## (undated) DEVICE — TROCAR 5X100 NON BLADED Z-TH - READ KII (6/BX)

## (undated) DEVICE — TUBE E-T HI-LO CUFF 6.5MM (10EA/BX)

## (undated) DEVICE — SET LEADWIRE 5 LEAD BEDSIDE DISPOSABLE ECG (1SET OF 5/EA)

## (undated) DEVICE — SUTURE 3-0 ETHILON FS-1 - (36/BX) 30 INCH

## (undated) DEVICE — SET SUCTION/IRRIGATION WITH DISPOSABLE TIP (6/CA )PART #0250-070-520 IS A SUB

## (undated) DEVICE — WATER IRRIGATION STERILE 1000ML (12EA/CA)

## (undated) DEVICE — GLOVE SZ 6.5 BIOGEL PI MICRO - PF LF (50PR/BX)